# Patient Record
Sex: FEMALE | Race: WHITE | NOT HISPANIC OR LATINO | Employment: OTHER | URBAN - NONMETROPOLITAN AREA
[De-identification: names, ages, dates, MRNs, and addresses within clinical notes are randomized per-mention and may not be internally consistent; named-entity substitution may affect disease eponyms.]

---

## 2017-02-08 ENCOUNTER — TRANSCRIBE ORDERS (OUTPATIENT)
Dept: ADMINISTRATIVE | Facility: HOSPITAL | Age: 42
End: 2017-02-08

## 2017-02-08 DIAGNOSIS — R10.84 ABDOMINAL PAIN, GENERALIZED: Primary | ICD-10-CM

## 2017-02-15 ENCOUNTER — APPOINTMENT (OUTPATIENT)
Dept: CT IMAGING | Facility: HOSPITAL | Age: 42
End: 2017-02-15

## 2017-02-22 ENCOUNTER — APPOINTMENT (OUTPATIENT)
Dept: CT IMAGING | Facility: HOSPITAL | Age: 42
End: 2017-02-22

## 2019-02-07 ENCOUNTER — APPOINTMENT (OUTPATIENT)
Dept: MAMMOGRAPHY | Facility: HOSPITAL | Age: 44
End: 2019-02-07

## 2019-05-30 ENCOUNTER — TRANSCRIBE ORDERS (OUTPATIENT)
Dept: ADMINISTRATIVE | Facility: HOSPITAL | Age: 44
End: 2019-05-30

## 2019-05-30 DIAGNOSIS — M54.2 NECK PAIN: Primary | ICD-10-CM

## 2019-06-04 ENCOUNTER — APPOINTMENT (OUTPATIENT)
Dept: MRI IMAGING | Facility: HOSPITAL | Age: 44
End: 2019-06-04

## 2019-06-06 ENCOUNTER — HOSPITAL ENCOUNTER (OUTPATIENT)
Dept: MRI IMAGING | Facility: HOSPITAL | Age: 44
Discharge: HOME | End: 2019-06-06
Admitting: NURSE PRACTITIONER

## 2019-06-06 DIAGNOSIS — M54.2 NECK PAIN: ICD-10-CM

## 2019-06-06 PROCEDURE — 72141 MRI NECK SPINE W/O DYE: CPT

## 2019-06-06 PROCEDURE — 72141 MRI NECK SPINE W/O DYE: CPT | Performed by: RADIOLOGY

## 2020-03-02 ENCOUNTER — OFFICE VISIT (OUTPATIENT)
Dept: NEUROSURGERY | Facility: CLINIC | Age: 45
End: 2020-03-02

## 2020-03-02 VITALS
WEIGHT: 185 LBS | DIASTOLIC BLOOD PRESSURE: 58 MMHG | TEMPERATURE: 98.4 F | SYSTOLIC BLOOD PRESSURE: 96 MMHG | BODY MASS INDEX: 34.04 KG/M2 | HEIGHT: 62 IN

## 2020-03-02 DIAGNOSIS — I63.9 CEREBROVASCULAR ACCIDENT (CVA), UNSPECIFIED MECHANISM (HCC): ICD-10-CM

## 2020-03-02 DIAGNOSIS — G43.101 MIGRAINE WITH AURA AND WITH STATUS MIGRAINOSUS, NOT INTRACTABLE: ICD-10-CM

## 2020-03-02 DIAGNOSIS — M50.30 DEGENERATIVE DISC DISEASE, CERVICAL: Primary | ICD-10-CM

## 2020-03-02 PROCEDURE — 99203 OFFICE O/P NEW LOW 30 MIN: CPT | Performed by: NEUROLOGICAL SURGERY

## 2020-03-02 RX ORDER — DIAZEPAM 5 MG/1
5 TABLET ORAL 3 TIMES DAILY
Qty: 30 TABLET | Refills: 0 | Status: SHIPPED | OUTPATIENT
Start: 2020-03-02 | End: 2021-06-08

## 2020-03-02 RX ORDER — HYDROCHLOROTHIAZIDE 25 MG/1
25 TABLET ORAL DAILY
COMMUNITY
Start: 2015-11-09

## 2020-03-02 RX ORDER — LISINOPRIL 20 MG/1
20 TABLET ORAL DAILY
COMMUNITY
Start: 2014-03-14

## 2020-03-02 RX ORDER — CONJUGATED ESTROGENS 0.62 MG/1
0.62 TABLET, FILM COATED ORAL DAILY
COMMUNITY
Start: 2020-02-03

## 2020-03-02 RX ORDER — BACLOFEN 20 MG/1
20 TABLET ORAL 3 TIMES DAILY
COMMUNITY

## 2020-03-02 RX ORDER — IMIPRAMINE PAMOATE 75 MG
75 CAPSULE ORAL NIGHTLY
Qty: 30 CAPSULE | Refills: 0 | Status: SHIPPED | OUTPATIENT
Start: 2020-03-02 | End: 2021-06-08

## 2020-03-02 NOTE — PROGRESS NOTES
"Stacey Diaz  1975  [id number]      Chief Complaint   Patient presents with   • Neck Pain   • Shoulder Pain   • Transient Ischemic Attack     02/23/20       HISTORY OF PRESENT ILLNESS: This is a 44-year-old female referred with a chief complaint of longstanding headaches \"for years\".  She also is complaining of severe neck pain which she has had \"for years.  The headaches are holocephalic and she describes them as migraine.  Pain in the neck is axial, nonradicular and not that associated with nerve root or spinal cord compression.  She recently had what was described as a \"TIA\".  This was manifest as lessening of her levels of consciousness without focality.  She states she was told that she had had 2 previous strokes.  She has not had additional studies.  She has not seen a neurologist for migraine headache.    Past Medical History:   Diagnosis Date   • Arthritis    • Headache    • Hypertension    • Kidney disease    • Kidney disease    • Stroke (CMS/HCC)        Past Surgical History:   Procedure Laterality Date   • APPENDECTOMY     • COLON RESECTION SMALL BOWEL     • GALLBLADDER SURGERY     • HYSTERECTOMY         History reviewed. No pertinent family history.    Social History     Socioeconomic History   • Marital status:      Spouse name: Not on file   • Number of children: Not on file   • Years of education: Not on file   • Highest education level: Not on file   Tobacco Use   • Smoking status: Never Smoker   • Smokeless tobacco: Never Used   Substance and Sexual Activity   • Alcohol use: Yes   • Drug use: Never   • Sexual activity: Defer       Allergies   Allergen Reactions   • Nsaids Other (See Comments)   • Latex Rash         Current Outpatient Medications:   •  hydroCHLOROthiazide (HYDRODIURIL) 25 MG tablet, hydrochlorothiazide 25 mg tablet, Disp: , Rfl:   •  lisinopril (PRINIVIL,ZESTRIL) 20 MG tablet, lisinopril 20 mg tablet, Disp: , Rfl:   •  PREMARIN 0.625 MG tablet, , Disp: , Rfl: "     Review of Systems   Constitutional: Positive for activity change, appetite change, chills, fatigue and unexpected weight change. Negative for diaphoresis and fever.   HENT: Positive for facial swelling, tinnitus and voice change. Negative for congestion, dental problem, drooling, ear discharge, ear pain, hearing loss, mouth sores, nosebleeds, postnasal drip, rhinorrhea, sinus pressure, sinus pain, sneezing, sore throat and trouble swallowing.    Eyes: Positive for photophobia, pain, redness and visual disturbance. Negative for discharge and itching.   Respiratory: Positive for apnea and stridor. Negative for cough, choking, chest tightness, shortness of breath and wheezing.    Cardiovascular: Negative for chest pain, palpitations and leg swelling.   Gastrointestinal: Positive for abdominal distention and abdominal pain. Negative for anal bleeding, blood in stool, constipation, diarrhea, nausea, rectal pain and vomiting.   Endocrine: Positive for polydipsia. Negative for cold intolerance, heat intolerance, polyphagia and polyuria.   Genitourinary: Positive for decreased urine volume, difficulty urinating, dyspareunia, pelvic pain and vaginal pain. Negative for dysuria, enuresis, flank pain, frequency, genital sores, hematuria, menstrual problem, urgency, vaginal bleeding and vaginal discharge.   Musculoskeletal: Positive for arthralgias, back pain, gait problem, joint swelling, myalgias, neck pain and neck stiffness.   Skin: Positive for rash and wound. Negative for color change and pallor.   Allergic/Immunologic: Negative for environmental allergies, food allergies and immunocompromised state.   Neurological: Positive for dizziness, speech difficulty, weakness, light-headedness, numbness and headaches. Negative for tremors, seizures, syncope and facial asymmetry.   Hematological: Negative for adenopathy. Does not bruise/bleed easily.   Psychiatric/Behavioral: Positive for agitation, confusion, decreased  "concentration and sleep disturbance. Negative for behavioral problems, dysphoric mood, hallucinations, self-injury and suicidal ideas. The patient is nervous/anxious and is hyperactive.        Vitals:    03/02/20 1222   BP: 96/58   BP Location: Right arm   Patient Position: Sitting   Cuff Size: Adult   Temp: 98.4 °F (36.9 °C)   Weight: 83.9 kg (185 lb)   Height: 157.5 cm (62\")       Neurological Examination:  Mental status/speech: The patient is alert and oriented.  Speech is clear without aphysia or dysarthria.  No overt cognitive deficits.    Cranial nerve examination:    Olfaction: Smell is intact.  Vision: Vision is intact without visual field abnormalities.  Funduscopic examination is normal.  No pupillary irregularity.  Ocular motor examination: The extraocular muscles are intact.  There is no diplopia.  The pupil is round and reactive to both light and accommodation.  There is no nystagmus.  Facial movement/sensation: There is no facial weakness.  Sensation is intact in the first, second, and third divisions of the trigeminal nerve.  The corneal reflex is intact.  Auditory: Hearing is intact to finger rub bilaterally.  Cranial nerves IX, X, XI, XII: Phonation is normal.  No dysphagia.  Tongue is protruded in the midline without atrophy.  The gag reflex is intact.  Shoulder shrug is normal.    Musculoligamentous ligamentous examination: BMI 33.8.  She has limited range of motion of the cervical spine.  I am unable to detect focal weakness, sensory loss or reflex asymmetry.  Gait is normal.          Medical Decision Making:     Diagnostic Data Set: MRI of the brain does not show any high-grade abnormalities or recent infarction.  Cervical MRI shows mild degeneration of intervertebral disc without compression of the nerve root or spinal cord      Assessment: Stress related illness, migraine headache          Recommendations: She has no neurosurgical abnormality.  I think she needs to be evaluated by neurology in " "reference to her headache which I suspect is stress related.  I have made an appoint for her to go to comprehensive care.  She is markedly depressed, anxious and with symptoms referable to changes and her \"mood\".  I have given her a prescription of Tofranil 75 mg at bedtime; diazepam 5 mg 3 times daily.  I referred her to neurology and comprehensive care.        I greatly appreciate the opportunity to see and evaluate this individual.  If you have questions or concerns regarding issues that I may have overlooked please call me at any time: [phone].  Kolby Noel M.D.  [facility]  [address] [place] [address].  [place] [id number]    "

## 2020-03-03 ENCOUNTER — TELEPHONE (OUTPATIENT)
Dept: NEUROSURGERY | Facility: CLINIC | Age: 45
End: 2020-03-03

## 2020-03-03 RX ORDER — DULOXETIN HYDROCHLORIDE 30 MG/1
30 CAPSULE, DELAYED RELEASE ORAL DAILY
Qty: 30 CAPSULE | Refills: 0 | Status: SHIPPED | OUTPATIENT
Start: 2020-03-03 | End: 2021-06-08

## 2020-03-03 NOTE — TELEPHONE ENCOUNTER
Per Dr. Noel, pt was not given Gabapentin because she does not need that.  He gave her what he feels she needs based on her symptoms.  If she wishes to have Gabapentin she will need to see her PCP and be referred to Pain management.

## 2020-03-03 NOTE — TELEPHONE ENCOUNTER
Patient states that she has been on Cymbalta and Lyrica about 5 years ago, however it was not helpful.

## 2020-03-03 NOTE — TELEPHONE ENCOUNTER
Patient notified that her medication has been changed.  She wants to know why everyone else around her get's Gabapentin.  She wants to know why she cannot have that?

## 2020-03-03 NOTE — TELEPHONE ENCOUNTER
Provider: DR. BANG  Caller: PATIENT  Relationship to Patient: SELF  Pharmacy: [facility]  Phone Number: [phone]  Reason for Call: ASKING FOR PRIOR AUTH-IMIPRAMINE  When was the patient last seen: 03/02/20

## 2020-03-03 NOTE — TELEPHONE ENCOUNTER
I spoke with the pharmacy and this is an old anti-depressant that her Insurance will not cover.  It is $115.00 a month.    Would you like to change?

## 2020-03-04 NOTE — TELEPHONE ENCOUNTER
Pt aware that we will not RX Gabapentin, Cymbalta has been sent to her pharmacy. She will call with an update in about 2-3 weeks.

## 2020-03-11 ENCOUNTER — TELEPHONE (OUTPATIENT)
Dept: NEUROSURGERY | Facility: CLINIC | Age: 45
End: 2020-03-11

## 2020-03-11 DIAGNOSIS — G43.101 MIGRAINE WITH AURA AND WITH STATUS MIGRAINOSUS, NOT INTRACTABLE: ICD-10-CM

## 2020-03-11 DIAGNOSIS — M50.30 DEGENERATIVE DISC DISEASE, CERVICAL: ICD-10-CM

## 2020-03-11 DIAGNOSIS — I63.9 CEREBROVASCULAR ACCIDENT (CVA), UNSPECIFIED MECHANISM (HCC): Primary | ICD-10-CM

## 2020-03-11 NOTE — TELEPHONE ENCOUNTER
----- Message from Adriel Noel MD sent at 3/11/2020  2:06 PM EDT -----  Regarding: RE: CTAs  CTA and Duplex fine. Is to see neurology and karen; call me aftereward. thx  ----- Message -----  From: Silvia Sifuentes MA  Sent: 3/11/2020  10:45 AM EDT  To: Adriel Noel MD  Subject: CTAs                                             CTA head & neck was ordered for pt and to see neurology and psych.  CTA head was approved.  Neck was denied.  Ins recommends a duplex first.  You did not request a f/u with pt.  Should we proceed with duplex or schedule CTA head only?     Thanks, NK

## 2020-03-12 ENCOUNTER — APPOINTMENT (OUTPATIENT)
Dept: CT IMAGING | Facility: HOSPITAL | Age: 45
End: 2020-03-12

## 2020-03-23 ENCOUNTER — HOSPITAL ENCOUNTER (OUTPATIENT)
Dept: CT IMAGING | Facility: HOSPITAL | Age: 45
End: 2020-03-23

## 2020-03-23 ENCOUNTER — APPOINTMENT (OUTPATIENT)
Dept: CARDIOLOGY | Facility: HOSPITAL | Age: 45
End: 2020-03-23

## 2020-05-12 ENCOUNTER — APPOINTMENT (OUTPATIENT)
Dept: CT IMAGING | Facility: HOSPITAL | Age: 45
End: 2020-05-12

## 2020-05-12 ENCOUNTER — APPOINTMENT (OUTPATIENT)
Dept: CARDIOLOGY | Facility: HOSPITAL | Age: 45
End: 2020-05-12

## 2020-05-20 ENCOUNTER — HOSPITAL ENCOUNTER (OUTPATIENT)
Dept: ULTRASOUND IMAGING | Facility: HOSPITAL | Age: 45
End: 2020-05-20

## 2020-05-20 ENCOUNTER — APPOINTMENT (OUTPATIENT)
Dept: CT IMAGING | Facility: HOSPITAL | Age: 45
End: 2020-05-20

## 2020-05-20 ENCOUNTER — APPOINTMENT (OUTPATIENT)
Dept: CARDIOLOGY | Facility: HOSPITAL | Age: 45
End: 2020-05-20

## 2020-06-02 ENCOUNTER — HOSPITAL ENCOUNTER (OUTPATIENT)
Dept: ULTRASOUND IMAGING | Facility: HOSPITAL | Age: 45
End: 2020-06-02

## 2020-06-02 ENCOUNTER — APPOINTMENT (OUTPATIENT)
Dept: CT IMAGING | Facility: HOSPITAL | Age: 45
End: 2020-06-02

## 2020-07-17 ENCOUNTER — HOSPITAL ENCOUNTER (EMERGENCY)
Facility: HOSPITAL | Age: 45
Discharge: LEFT BEFORE DISCHARGE | End: 2020-07-17

## 2020-07-17 VITALS
RESPIRATION RATE: 18 BRPM | TEMPERATURE: 98.9 F | DIASTOLIC BLOOD PRESSURE: 65 MMHG | HEART RATE: 94 BPM | HEIGHT: 62 IN | OXYGEN SATURATION: 98 % | WEIGHT: 185 LBS | BODY MASS INDEX: 34.04 KG/M2 | SYSTOLIC BLOOD PRESSURE: 133 MMHG

## 2020-07-18 NOTE — ED NOTES
"Pt told triage tech that she was leaving; states \" this place is inhumane I am going to [place]\" refused to sign LWBS form     Marlon Tompkins, RN  07/18/20 0694    "

## 2020-07-18 NOTE — ED NOTES
"Pt  in waiting room screaming \" I need you to get my wife an ambulance or a helicopter to a [place]. You're not doing anything by letting her sit here in the lobby\" advised him that the patients are taken back based on acuity and that patient would be roomed as soon as possible. Apologized for delay and explained to him that patient could not be transferred without provider evaluation and workup. Patients  states \" [facility]. Her pain is worse\" pt continues to complain of abdominal pain rated at a 10; patient appears to be in no acute distress. Advised patient room assignment will be made as soon as possible again.      Marlon Tompkins, RN  07/18/20 0676    "

## 2020-11-11 ENCOUNTER — TELEPHONE (OUTPATIENT)
Dept: NEUROSURGERY | Facility: CLINIC | Age: 45
End: 2020-11-11

## 2020-11-11 DIAGNOSIS — G43.101 MIGRAINE WITH AURA AND WITH STATUS MIGRAINOSUS, NOT INTRACTABLE: ICD-10-CM

## 2020-11-11 DIAGNOSIS — I63.9 CEREBROVASCULAR ACCIDENT (CVA), UNSPECIFIED MECHANISM: Primary | ICD-10-CM

## 2020-11-11 DIAGNOSIS — M50.30 DEGENERATIVE DISC DISEASE, CERVICAL: ICD-10-CM

## 2020-11-11 NOTE — TELEPHONE ENCOUNTER
Caller: PT    Relationship: SELF    Best call back number: [phone]    What orders are you requesting (i.e. lab or imaging): CT ORDERS    In what timeframe would the patient need to come in: ASAP    Where will you receive your lab/imaging services: [facility]    Additional notes: PT STATES THAT THE CT ORDERS ARE  AND THAT SHE NEEDS IT REORDERED.

## 2020-12-03 ENCOUNTER — HOSPITAL ENCOUNTER (OUTPATIENT)
Dept: CARDIOLOGY | Facility: HOSPITAL | Age: 45
Discharge: HOME | End: 2020-12-03

## 2020-12-03 ENCOUNTER — HOSPITAL ENCOUNTER (OUTPATIENT)
Dept: CT IMAGING | Facility: HOSPITAL | Age: 45
Discharge: HOME | End: 2020-12-03

## 2020-12-03 DIAGNOSIS — I63.9 CEREBROVASCULAR ACCIDENT (CVA), UNSPECIFIED MECHANISM (HCC): ICD-10-CM

## 2020-12-03 DIAGNOSIS — G43.101 MIGRAINE WITH AURA AND WITH STATUS MIGRAINOSUS, NOT INTRACTABLE: ICD-10-CM

## 2020-12-03 DIAGNOSIS — M50.30 DEGENERATIVE DISC DISEASE, CERVICAL: ICD-10-CM

## 2020-12-03 DIAGNOSIS — I63.9 CEREBROVASCULAR ACCIDENT (CVA), UNSPECIFIED MECHANISM (HCC): Primary | ICD-10-CM

## 2020-12-03 PROCEDURE — 70496 CT ANGIOGRAPHY HEAD: CPT

## 2020-12-03 PROCEDURE — 93880 EXTRACRANIAL BILAT STUDY: CPT

## 2020-12-03 PROCEDURE — 70498 CT ANGIOGRAPHY NECK: CPT | Performed by: RADIOLOGY

## 2020-12-03 PROCEDURE — 70496 CT ANGIOGRAPHY HEAD: CPT | Performed by: RADIOLOGY

## 2020-12-03 PROCEDURE — 93880 EXTRACRANIAL BILAT STUDY: CPT | Performed by: RADIOLOGY

## 2020-12-03 PROCEDURE — 25010000002 IOPAMIDOL 61 % SOLUTION: Performed by: NEUROLOGICAL SURGERY

## 2020-12-03 PROCEDURE — 70498 CT ANGIOGRAPHY NECK: CPT

## 2020-12-03 RX ADMIN — IOPAMIDOL 100 ML: 612 INJECTION, SOLUTION INTRAVENOUS at 16:53

## 2020-12-04 ENCOUNTER — DOCUMENTATION (OUTPATIENT)
Dept: NEUROSURGERY | Facility: CLINIC | Age: 45
End: 2020-12-04

## 2020-12-04 ENCOUNTER — TELEPHONE (OUTPATIENT)
Dept: NEUROSURGERY | Facility: CLINIC | Age: 45
End: 2020-12-04

## 2020-12-04 NOTE — TELEPHONE ENCOUNTER
Caller: PT    Relationship to patient: SELF    Best call back number: [phone]    Chief complaint:     Type of visit: FOLLOW UP ([place])    Requested date: ASAP     If rescheduling, when is the original appointment: 12/03/20     Additional notes:PT GOT HELD UP AT THE HOSPITAL UNTIL 5:00 GETTING HER CAROTID ULTRASOUND AND HER CT ANGIOGRAM HEAD AND NECK. BY THE TIME SHE MADE IT TO OUR OFFICE IN [place] WE WERE GONE.  SHE WANTS TO KNOW IF THE DR CAN LOOK AT HER TEST AND CALL HER IF THERE IS SOMETHING THAT SHE NEEDS TO KNOW.

## 2020-12-04 NOTE — PROGRESS NOTES
Reviewed studies. ALL normal indicating no surgical abnormality to explain her symptoms. Reviewed cervical MRI (6/19). No HNP or abnormality that provides clinical correlation. Suggest she see PCP, ? neurology

## 2021-03-23 ENCOUNTER — BULK ORDERING (OUTPATIENT)
Dept: CASE MANAGEMENT | Facility: OTHER | Age: 46
End: 2021-03-23

## 2021-03-23 DIAGNOSIS — Z23 IMMUNIZATION DUE: ICD-10-CM

## 2021-06-08 ENCOUNTER — APPOINTMENT (OUTPATIENT)
Dept: CT IMAGING | Facility: HOSPITAL | Age: 46
End: 2021-06-08

## 2021-06-08 ENCOUNTER — HOSPITAL ENCOUNTER (INPATIENT)
Facility: HOSPITAL | Age: 46
LOS: 2 days | Discharge: LEFT BEFORE DISCHARGE | End: 2021-06-10
Attending: STUDENT IN AN ORGANIZED HEALTH CARE EDUCATION/TRAINING PROGRAM | Admitting: INTERNAL MEDICINE

## 2021-06-08 ENCOUNTER — APPOINTMENT (OUTPATIENT)
Dept: GENERAL RADIOLOGY | Facility: HOSPITAL | Age: 46
End: 2021-06-08

## 2021-06-08 DIAGNOSIS — J18.9 PNEUMONIA OF LEFT LUNG DUE TO INFECTIOUS ORGANISM, UNSPECIFIED PART OF LUNG: Primary | ICD-10-CM

## 2021-06-08 DIAGNOSIS — N30.01 ACUTE CYSTITIS WITH HEMATURIA: ICD-10-CM

## 2021-06-08 DIAGNOSIS — A41.9 SEPTIC SHOCK (HCC): ICD-10-CM

## 2021-06-08 DIAGNOSIS — R65.21 SEPTIC SHOCK (HCC): ICD-10-CM

## 2021-06-08 PROBLEM — R65.20 SEVERE SEPSIS (HCC): Status: ACTIVE | Noted: 2021-06-08

## 2021-06-08 LAB
6-ACETYL MORPHINE: NEGATIVE
A-A DO2: 102.8 MMHG (ref 0–300)
A-A DO2: 141.9 MMHG (ref 0–300)
ALBUMIN SERPL-MCNC: 2.82 G/DL (ref 3.5–5.2)
ALBUMIN/GLOB SERPL: 1.1 G/DL
ALP SERPL-CCNC: 45 U/L (ref 39–117)
ALT SERPL W P-5'-P-CCNC: 23 U/L (ref 1–33)
AMMONIA BLD-SCNC: 55 UMOL/L (ref 11–51)
AMPHET+METHAMPHET UR QL: NEGATIVE
ANION GAP SERPL CALCULATED.3IONS-SCNC: 9.2 MMOL/L (ref 5–15)
APAP SERPL-MCNC: <5 MCG/ML (ref 0–30)
APTT PPP: 28.9 SECONDS (ref 25.5–35.4)
ARTERIAL PATENCY WRIST A: ABNORMAL
ARTERIAL PATENCY WRIST A: ABNORMAL
AST SERPL-CCNC: 13 U/L (ref 1–32)
ATMOSPHERIC PRESS: 729 MMHG
ATMOSPHERIC PRESS: 730 MMHG
B PARAPERT DNA SPEC QL NAA+PROBE: NOT DETECTED
B PERT DNA SPEC QL NAA+PROBE: NOT DETECTED
BACTERIA UR QL AUTO: ABNORMAL /HPF
BARBITURATES UR QL SCN: NEGATIVE
BASE EXCESS BLDA CALC-SCNC: 4.1 MMOL/L (ref 0–2)
BASE EXCESS BLDA CALC-SCNC: 9.8 MMOL/L (ref 0–2)
BASOPHILS # BLD AUTO: 0.02 10*3/MM3 (ref 0–0.2)
BASOPHILS NFR BLD AUTO: 0.1 % (ref 0–1.5)
BDY SITE: ABNORMAL
BDY SITE: ABNORMAL
BENZODIAZ UR QL SCN: POSITIVE
BILIRUB SERPL-MCNC: 0.5 MG/DL (ref 0–1.2)
BILIRUB UR QL STRIP: ABNORMAL
BODY TEMPERATURE: 0 C
BODY TEMPERATURE: 0 C
BUN SERPL-MCNC: 28 MG/DL (ref 6–20)
BUN/CREAT SERPL: 14.5 (ref 7–25)
BUPRENORPHINE SERPL-MCNC: NEGATIVE NG/ML
C PNEUM DNA NPH QL NAA+NON-PROBE: NOT DETECTED
CALCIUM SPEC-SCNC: 7.9 MG/DL (ref 8.6–10.5)
CANNABINOIDS SERPL QL: POSITIVE
CHLORIDE SERPL-SCNC: 97 MMOL/L (ref 98–107)
CK SERPL-CCNC: 109 U/L (ref 20–180)
CLARITY UR: ABNORMAL
CO2 BLDA-SCNC: 30.1 MMOL/L (ref 22–33)
CO2 BLDA-SCNC: 35 MMOL/L (ref 22–33)
CO2 SERPL-SCNC: 28.8 MMOL/L (ref 22–29)
COCAINE UR QL: NEGATIVE
COHGB MFR BLD: 0.5 % (ref 0–5)
COHGB MFR BLD: 0.7 % (ref 0–5)
COLOR UR: ABNORMAL
CREAT SERPL-MCNC: 1.93 MG/DL (ref 0.57–1)
CRP SERPL-MCNC: 25.29 MG/DL (ref 0–0.5)
D DIMER PPP FEU-MCNC: 0.64 MCGFEU/ML (ref 0–0.5)
D-LACTATE SERPL-SCNC: 0.8 MMOL/L (ref 0.5–2)
DEPRECATED RDW RBC AUTO: 53.8 FL (ref 37–54)
EOSINOPHIL # BLD AUTO: 0.03 10*3/MM3 (ref 0–0.4)
EOSINOPHIL NFR BLD AUTO: 0.2 % (ref 0.3–6.2)
ERYTHROCYTE [DISTWIDTH] IN BLOOD BY AUTOMATED COUNT: 16.3 % (ref 12.3–15.4)
ERYTHROCYTE [SEDIMENTATION RATE] IN BLOOD: 12 MM/HR (ref 0–20)
ETHANOL BLD-MCNC: <10 MG/DL (ref 0–10)
ETHANOL UR QL: <0.01 %
FLUAV RNA RESP QL NAA+PROBE: NOT DETECTED
FLUAV SUBTYP SPEC NAA+PROBE: NOT DETECTED
FLUBV RNA ISLT QL NAA+PROBE: NOT DETECTED
FLUBV RNA RESP QL NAA+PROBE: NOT DETECTED
GAS FLOW AIRWAY: 2 LPM
GAS FLOW AIRWAY: 4 LPM
GFR SERPL CREATININE-BSD FRML MDRD: 28 ML/MIN/1.73
GLOBULIN UR ELPH-MCNC: 2.5 GM/DL
GLUCOSE SERPL-MCNC: 196 MG/DL (ref 65–99)
GLUCOSE UR STRIP-MCNC: ABNORMAL MG/DL
HADV DNA SPEC NAA+PROBE: NOT DETECTED
HAV IGM SERPL QL IA: NORMAL
HBV CORE IGM SERPL QL IA: NORMAL
HBV SURFACE AG SERPL QL IA: NORMAL
HCO3 BLDA-SCNC: 28.8 MMOL/L (ref 20–26)
HCO3 BLDA-SCNC: 33.7 MMOL/L (ref 20–26)
HCOV 229E RNA SPEC QL NAA+PROBE: NOT DETECTED
HCOV HKU1 RNA SPEC QL NAA+PROBE: NOT DETECTED
HCOV NL63 RNA SPEC QL NAA+PROBE: NOT DETECTED
HCOV OC43 RNA SPEC QL NAA+PROBE: NOT DETECTED
HCT VFR BLD AUTO: 40.3 % (ref 34–46.6)
HCT VFR BLD CALC: 36.4 % (ref 38–51)
HCT VFR BLD CALC: 43.8 % (ref 38–51)
HCV AB SER DONR QL: NORMAL
HGB BLD-MCNC: 12.6 G/DL (ref 12–15.9)
HGB BLDA-MCNC: 11.9 G/DL (ref 13.5–17.5)
HGB BLDA-MCNC: 14.3 G/DL (ref 13.5–17.5)
HGB UR QL STRIP.AUTO: ABNORMAL
HMPV RNA NPH QL NAA+NON-PROBE: NOT DETECTED
HOLD SPECIMEN: NORMAL
HOLD SPECIMEN: NORMAL
HPIV1 RNA SPEC QL NAA+PROBE: NOT DETECTED
HPIV2 RNA SPEC QL NAA+PROBE: NOT DETECTED
HPIV3 RNA NPH QL NAA+PROBE: NOT DETECTED
HPIV4 P GENE NPH QL NAA+PROBE: NOT DETECTED
HYALINE CASTS UR QL AUTO: ABNORMAL /LPF
IMM GRANULOCYTES # BLD AUTO: 0.12 10*3/MM3 (ref 0–0.05)
IMM GRANULOCYTES NFR BLD AUTO: 0.8 % (ref 0–0.5)
INHALED O2 CONCENTRATION: 28 %
INHALED O2 CONCENTRATION: 36 %
INR PPP: 0.95 (ref 0.9–1.1)
KETONES UR QL STRIP: ABNORMAL
LEUKOCYTE ESTERASE UR QL STRIP.AUTO: ABNORMAL
LYMPHOCYTES # BLD AUTO: 3.41 10*3/MM3 (ref 0.7–3.1)
LYMPHOCYTES NFR BLD AUTO: 22.9 % (ref 19.6–45.3)
Lab: ABNORMAL
M PNEUMO IGG SER IA-ACNC: NOT DETECTED
MAGNESIUM SERPL-MCNC: 1.8 MG/DL (ref 1.6–2.6)
MCH RBC QN AUTO: 27.8 PG (ref 26.6–33)
MCHC RBC AUTO-ENTMCNC: 31.3 G/DL (ref 31.5–35.7)
MCV RBC AUTO: 89 FL (ref 79–97)
METHADONE UR QL SCN: NEGATIVE
METHGB BLD QL: 0.5 % (ref 0–3)
METHGB BLD QL: 0.7 % (ref 0–3)
MODALITY: ABNORMAL
MODALITY: ABNORMAL
MONOCYTES # BLD AUTO: 0.66 10*3/MM3 (ref 0.1–0.9)
MONOCYTES NFR BLD AUTO: 4.4 % (ref 5–12)
MRSA DNA SPEC QL NAA+PROBE: NEGATIVE
NEUTROPHILS NFR BLD AUTO: 10.64 10*3/MM3 (ref 1.7–7)
NEUTROPHILS NFR BLD AUTO: 71.6 % (ref 42.7–76)
NITRITE UR QL STRIP: POSITIVE
NOTE: ABNORMAL
NOTE: ABNORMAL
NOTIFIED BY: ABNORMAL
NOTIFIED BY: ABNORMAL
NOTIFIED WHO: ABNORMAL
NOTIFIED WHO: ABNORMAL
NRBC BLD AUTO-RTO: 0.1 /100 WBC (ref 0–0.2)
NT-PROBNP SERPL-MCNC: 1163 PG/ML (ref 0–450)
OPIATES UR QL: POSITIVE
OXYCODONE UR QL SCN: POSITIVE
OXYHGB MFR BLDV: 78.6 % (ref 94–99)
OXYHGB MFR BLDV: 86.7 % (ref 94–99)
PCO2 BLDA: 41.6 MM HG (ref 35–45)
PCO2 BLDA: 42.9 MM HG (ref 35–45)
PCO2 TEMP ADJ BLD: ABNORMAL MM[HG]
PCO2 TEMP ADJ BLD: ABNORMAL MM[HG]
PCP UR QL SCN: NEGATIVE
PH BLDA: 7.43 PH UNITS (ref 7.35–7.45)
PH BLDA: 7.52 PH UNITS (ref 7.35–7.45)
PH UR STRIP.AUTO: 5.5 [PH] (ref 5–8)
PH, TEMP CORRECTED: ABNORMAL
PH, TEMP CORRECTED: ABNORMAL
PHOSPHATE SERPL-MCNC: 2.6 MG/DL (ref 2.5–4.5)
PLATELET # BLD AUTO: 125 10*3/MM3 (ref 140–450)
PMV BLD AUTO: 9.5 FL (ref 6–12)
PO2 BLDA: 41.9 MM HG (ref 83–108)
PO2 BLDA: 56.3 MM HG (ref 83–108)
PO2 TEMP ADJ BLD: ABNORMAL MM[HG]
PO2 TEMP ADJ BLD: ABNORMAL MM[HG]
POTASSIUM SERPL-SCNC: 4 MMOL/L (ref 3.5–5.2)
PROT SERPL-MCNC: 5.3 G/DL (ref 6–8.5)
PROT UR QL STRIP: ABNORMAL
PROTHROMBIN TIME: 13.1 SECONDS (ref 12.8–14.5)
RBC # BLD AUTO: 4.53 10*6/MM3 (ref 3.77–5.28)
RBC # UR: ABNORMAL /HPF
REF LAB TEST METHOD: ABNORMAL
RHINOVIRUS RNA SPEC NAA+PROBE: NOT DETECTED
RSV RNA NPH QL NAA+NON-PROBE: NOT DETECTED
S AUREUS DNA SPEC QL NAA+PROBE: NEGATIVE
SALICYLATES SERPL-MCNC: <0.3 MG/DL
SAO2 % BLDCOA: 79.6 % (ref 94–99)
SAO2 % BLDCOA: 87.8 % (ref 94–99)
SARS-COV-2 RNA RESP QL NAA+PROBE: NOT DETECTED
SODIUM SERPL-SCNC: 135 MMOL/L (ref 136–145)
SP GR UR STRIP: >=1.03 (ref 1–1.03)
SQUAMOUS #/AREA URNS HPF: ABNORMAL /HPF
T4 FREE SERPL-MCNC: 0.88 NG/DL (ref 0.93–1.7)
TROPONIN T SERPL-MCNC: 0.02 NG/ML (ref 0–0.03)
TROPONIN T SERPL-MCNC: <0.01 NG/ML (ref 0–0.03)
TSH SERPL DL<=0.05 MIU/L-ACNC: 0.72 UIU/ML (ref 0.27–4.2)
UROBILINOGEN UR QL STRIP: ABNORMAL
VENTILATOR MODE: ABNORMAL
VENTILATOR MODE: ABNORMAL
WBC # BLD AUTO: 14.88 10*3/MM3 (ref 3.4–10.8)
WBC UR QL AUTO: ABNORMAL /HPF
WHOLE BLOOD HOLD SPECIMEN: NORMAL
WHOLE BLOOD HOLD SPECIMEN: NORMAL

## 2021-06-08 PROCEDURE — C1751 CATH, INF, PER/CENT/MIDLINE: HCPCS

## 2021-06-08 PROCEDURE — P9612 CATHETERIZE FOR URINE SPEC: HCPCS

## 2021-06-08 PROCEDURE — 80053 COMPREHEN METABOLIC PANEL: CPT | Performed by: STUDENT IN AN ORGANIZED HEALTH CARE EDUCATION/TRAINING PROGRAM

## 2021-06-08 PROCEDURE — 87040 BLOOD CULTURE FOR BACTERIA: CPT | Performed by: STUDENT IN AN ORGANIZED HEALTH CARE EDUCATION/TRAINING PROGRAM

## 2021-06-08 PROCEDURE — 80307 DRUG TEST PRSMV CHEM ANLYZR: CPT | Performed by: STUDENT IN AN ORGANIZED HEALTH CARE EDUCATION/TRAINING PROGRAM

## 2021-06-08 PROCEDURE — 25010000002 PIPERACILLIN SOD-TAZOBACTAM PER 1 G: Performed by: STUDENT IN AN ORGANIZED HEALTH CARE EDUCATION/TRAINING PROGRAM

## 2021-06-08 PROCEDURE — 70450 CT HEAD/BRAIN W/O DYE: CPT

## 2021-06-08 PROCEDURE — 83605 ASSAY OF LACTIC ACID: CPT | Performed by: STUDENT IN AN ORGANIZED HEALTH CARE EDUCATION/TRAINING PROGRAM

## 2021-06-08 PROCEDURE — 84484 ASSAY OF TROPONIN QUANT: CPT | Performed by: STUDENT IN AN ORGANIZED HEALTH CARE EDUCATION/TRAINING PROGRAM

## 2021-06-08 PROCEDURE — 94640 AIRWAY INHALATION TREATMENT: CPT

## 2021-06-08 PROCEDURE — 87077 CULTURE AEROBIC IDENTIFY: CPT | Performed by: INTERNAL MEDICINE

## 2021-06-08 PROCEDURE — 80074 ACUTE HEPATITIS PANEL: CPT | Performed by: HOSPITALIST

## 2021-06-08 PROCEDURE — 83735 ASSAY OF MAGNESIUM: CPT | Performed by: STUDENT IN AN ORGANIZED HEALTH CARE EDUCATION/TRAINING PROGRAM

## 2021-06-08 PROCEDURE — 85652 RBC SED RATE AUTOMATED: CPT | Performed by: STUDENT IN AN ORGANIZED HEALTH CARE EDUCATION/TRAINING PROGRAM

## 2021-06-08 PROCEDURE — 85025 COMPLETE CBC W/AUTO DIFF WBC: CPT | Performed by: STUDENT IN AN ORGANIZED HEALTH CARE EDUCATION/TRAINING PROGRAM

## 2021-06-08 PROCEDURE — 25010000002 CEFTRIAXONE PER 250 MG: Performed by: STUDENT IN AN ORGANIZED HEALTH CARE EDUCATION/TRAINING PROGRAM

## 2021-06-08 PROCEDURE — 93010 ELECTROCARDIOGRAM REPORT: CPT | Performed by: INTERNAL MEDICINE

## 2021-06-08 PROCEDURE — 82375 ASSAY CARBOXYHB QUANT: CPT

## 2021-06-08 PROCEDURE — 83050 HGB METHEMOGLOBIN QUAN: CPT

## 2021-06-08 PROCEDURE — 85730 THROMBOPLASTIN TIME PARTIAL: CPT | Performed by: STUDENT IN AN ORGANIZED HEALTH CARE EDUCATION/TRAINING PROGRAM

## 2021-06-08 PROCEDURE — 82550 ASSAY OF CK (CPK): CPT | Performed by: STUDENT IN AN ORGANIZED HEALTH CARE EDUCATION/TRAINING PROGRAM

## 2021-06-08 PROCEDURE — 70450 CT HEAD/BRAIN W/O DYE: CPT | Performed by: RADIOLOGY

## 2021-06-08 PROCEDURE — 80179 DRUG ASSAY SALICYLATE: CPT | Performed by: STUDENT IN AN ORGANIZED HEALTH CARE EDUCATION/TRAINING PROGRAM

## 2021-06-08 PROCEDURE — 36600 WITHDRAWAL OF ARTERIAL BLOOD: CPT

## 2021-06-08 PROCEDURE — 81001 URINALYSIS AUTO W/SCOPE: CPT | Performed by: STUDENT IN AN ORGANIZED HEALTH CARE EDUCATION/TRAINING PROGRAM

## 2021-06-08 PROCEDURE — 86140 C-REACTIVE PROTEIN: CPT | Performed by: STUDENT IN AN ORGANIZED HEALTH CARE EDUCATION/TRAINING PROGRAM

## 2021-06-08 PROCEDURE — 74176 CT ABD & PELVIS W/O CONTRAST: CPT | Performed by: RADIOLOGY

## 2021-06-08 PROCEDURE — 87086 URINE CULTURE/COLONY COUNT: CPT | Performed by: INTERNAL MEDICINE

## 2021-06-08 PROCEDURE — 71250 CT THORAX DX C-: CPT | Performed by: RADIOLOGY

## 2021-06-08 PROCEDURE — 87640 STAPH A DNA AMP PROBE: CPT | Performed by: HOSPITALIST

## 2021-06-08 PROCEDURE — 82077 ASSAY SPEC XCP UR&BREATH IA: CPT | Performed by: STUDENT IN AN ORGANIZED HEALTH CARE EDUCATION/TRAINING PROGRAM

## 2021-06-08 PROCEDURE — 87641 MR-STAPH DNA AMP PROBE: CPT | Performed by: HOSPITALIST

## 2021-06-08 PROCEDURE — 71045 X-RAY EXAM CHEST 1 VIEW: CPT | Performed by: RADIOLOGY

## 2021-06-08 PROCEDURE — 99223 1ST HOSP IP/OBS HIGH 75: CPT | Performed by: HOSPITALIST

## 2021-06-08 PROCEDURE — 84100 ASSAY OF PHOSPHORUS: CPT | Performed by: STUDENT IN AN ORGANIZED HEALTH CARE EDUCATION/TRAINING PROGRAM

## 2021-06-08 PROCEDURE — 93005 ELECTROCARDIOGRAM TRACING: CPT | Performed by: STUDENT IN AN ORGANIZED HEALTH CARE EDUCATION/TRAINING PROGRAM

## 2021-06-08 PROCEDURE — 82140 ASSAY OF AMMONIA: CPT | Performed by: STUDENT IN AN ORGANIZED HEALTH CARE EDUCATION/TRAINING PROGRAM

## 2021-06-08 PROCEDURE — 85379 FIBRIN DEGRADATION QUANT: CPT | Performed by: STUDENT IN AN ORGANIZED HEALTH CARE EDUCATION/TRAINING PROGRAM

## 2021-06-08 PROCEDURE — 71045 X-RAY EXAM CHEST 1 VIEW: CPT

## 2021-06-08 PROCEDURE — 74176 CT ABD & PELVIS W/O CONTRAST: CPT

## 2021-06-08 PROCEDURE — 87636 SARSCOV2 & INF A&B AMP PRB: CPT | Performed by: STUDENT IN AN ORGANIZED HEALTH CARE EDUCATION/TRAINING PROGRAM

## 2021-06-08 PROCEDURE — 84439 ASSAY OF FREE THYROXINE: CPT | Performed by: STUDENT IN AN ORGANIZED HEALTH CARE EDUCATION/TRAINING PROGRAM

## 2021-06-08 PROCEDURE — 84484 ASSAY OF TROPONIN QUANT: CPT | Performed by: HOSPITALIST

## 2021-06-08 PROCEDURE — 99285 EMERGENCY DEPT VISIT HI MDM: CPT

## 2021-06-08 PROCEDURE — 82962 GLUCOSE BLOOD TEST: CPT

## 2021-06-08 PROCEDURE — 25010000002 CEFEPIME PER 500 MG: Performed by: HOSPITALIST

## 2021-06-08 PROCEDURE — 71250 CT THORAX DX C-: CPT

## 2021-06-08 PROCEDURE — 83880 ASSAY OF NATRIURETIC PEPTIDE: CPT | Performed by: HOSPITALIST

## 2021-06-08 PROCEDURE — 25010000002 NALOXONE PER 1 MG: Performed by: HOSPITALIST

## 2021-06-08 PROCEDURE — 94799 UNLISTED PULMONARY SVC/PX: CPT

## 2021-06-08 PROCEDURE — 84443 ASSAY THYROID STIM HORMONE: CPT | Performed by: STUDENT IN AN ORGANIZED HEALTH CARE EDUCATION/TRAINING PROGRAM

## 2021-06-08 PROCEDURE — 87633 RESP VIRUS 12-25 TARGETS: CPT | Performed by: HOSPITALIST

## 2021-06-08 PROCEDURE — 87186 SC STD MICRODIL/AGAR DIL: CPT | Performed by: INTERNAL MEDICINE

## 2021-06-08 PROCEDURE — 80143 DRUG ASSAY ACETAMINOPHEN: CPT | Performed by: STUDENT IN AN ORGANIZED HEALTH CARE EDUCATION/TRAINING PROGRAM

## 2021-06-08 PROCEDURE — 82805 BLOOD GASES W/O2 SATURATION: CPT

## 2021-06-08 PROCEDURE — 85610 PROTHROMBIN TIME: CPT | Performed by: STUDENT IN AN ORGANIZED HEALTH CARE EDUCATION/TRAINING PROGRAM

## 2021-06-08 PROCEDURE — 25010000002 VANCOMYCIN: Performed by: STUDENT IN AN ORGANIZED HEALTH CARE EDUCATION/TRAINING PROGRAM

## 2021-06-08 RX ORDER — NALOXONE HYDROCHLORIDE 1 MG/ML
2 INJECTION INTRAMUSCULAR; INTRAVENOUS; SUBCUTANEOUS ONCE
Status: COMPLETED | OUTPATIENT
Start: 2021-06-08 | End: 2021-06-08

## 2021-06-08 RX ORDER — LISINOPRIL 10 MG/1
20 TABLET ORAL DAILY
Status: CANCELLED | OUTPATIENT
Start: 2021-06-09

## 2021-06-08 RX ORDER — SODIUM CHLORIDE 0.9 % (FLUSH) 0.9 %
10 SYRINGE (ML) INJECTION EVERY 12 HOURS SCHEDULED
Status: DISCONTINUED | OUTPATIENT
Start: 2021-06-09 | End: 2021-06-10 | Stop reason: HOSPADM

## 2021-06-08 RX ORDER — NALOXONE HCL 0.4 MG/ML
0.4 VIAL (ML) INJECTION ONCE
Status: COMPLETED | OUTPATIENT
Start: 2021-06-08 | End: 2021-06-08

## 2021-06-08 RX ORDER — UBIDECARENONE 100 MG
100 CAPSULE ORAL DAILY
COMMUNITY

## 2021-06-08 RX ORDER — ACETAMINOPHEN 650 MG/1
650 SUPPOSITORY RECTAL EVERY 4 HOURS PRN
Status: DISCONTINUED | OUTPATIENT
Start: 2021-06-08 | End: 2021-06-10 | Stop reason: HOSPADM

## 2021-06-08 RX ORDER — IPRATROPIUM BROMIDE AND ALBUTEROL SULFATE 2.5; .5 MG/3ML; MG/3ML
3 SOLUTION RESPIRATORY (INHALATION) ONCE
Status: COMPLETED | OUTPATIENT
Start: 2021-06-08 | End: 2021-06-08

## 2021-06-08 RX ORDER — SODIUM CHLORIDE 9 MG/ML
75 INJECTION, SOLUTION INTRAVENOUS CONTINUOUS
Status: DISCONTINUED | OUTPATIENT
Start: 2021-06-08 | End: 2021-06-10 | Stop reason: HOSPADM

## 2021-06-08 RX ORDER — PANTOPRAZOLE SODIUM 40 MG/1
40 TABLET, DELAYED RELEASE ORAL DAILY
COMMUNITY

## 2021-06-08 RX ORDER — SODIUM CHLORIDE 0.9 % (FLUSH) 0.9 %
10 SYRINGE (ML) INJECTION AS NEEDED
Status: DISCONTINUED | OUTPATIENT
Start: 2021-06-08 | End: 2021-06-10 | Stop reason: HOSPADM

## 2021-06-08 RX ORDER — LEVOTHYROXINE SODIUM 0.03 MG/1
25 TABLET ORAL DAILY
COMMUNITY

## 2021-06-08 RX ORDER — HYDROCHLOROTHIAZIDE 25 MG/1
25 TABLET ORAL DAILY
Status: CANCELLED | OUTPATIENT
Start: 2021-06-09

## 2021-06-08 RX ORDER — ACETAMINOPHEN 650 MG/1
650 SUPPOSITORY RECTAL ONCE
Status: COMPLETED | OUTPATIENT
Start: 2021-06-08 | End: 2021-06-08

## 2021-06-08 RX ORDER — SODIUM CHLORIDE 0.9 % (FLUSH) 0.9 %
20 SYRINGE (ML) INJECTION AS NEEDED
Status: DISCONTINUED | OUTPATIENT
Start: 2021-06-08 | End: 2021-06-10 | Stop reason: HOSPADM

## 2021-06-08 RX ORDER — SODIUM CHLORIDE 0.9 % (FLUSH) 0.9 %
10 SYRINGE (ML) INJECTION EVERY 12 HOURS SCHEDULED
Status: DISCONTINUED | OUTPATIENT
Start: 2021-06-08 | End: 2021-06-10 | Stop reason: HOSPADM

## 2021-06-08 RX ORDER — NALOXONE HYDROCHLORIDE 1 MG/ML
INJECTION INTRAMUSCULAR; INTRAVENOUS; SUBCUTANEOUS
Status: COMPLETED
Start: 2021-06-08 | End: 2021-06-08

## 2021-06-08 RX ORDER — ACETAMINOPHEN 325 MG/1
650 TABLET ORAL EVERY 4 HOURS PRN
Status: DISCONTINUED | OUTPATIENT
Start: 2021-06-08 | End: 2021-06-10 | Stop reason: HOSPADM

## 2021-06-08 RX ORDER — ONDANSETRON HYDROCHLORIDE 8 MG/1
8 TABLET, FILM COATED ORAL EVERY 8 HOURS PRN
COMMUNITY

## 2021-06-08 RX ORDER — IPRATROPIUM BROMIDE AND ALBUTEROL SULFATE 2.5; .5 MG/3ML; MG/3ML
3 SOLUTION RESPIRATORY (INHALATION)
Status: DISCONTINUED | OUTPATIENT
Start: 2021-06-09 | End: 2021-06-10 | Stop reason: HOSPADM

## 2021-06-08 RX ORDER — BACLOFEN 10 MG/1
20 TABLET ORAL 3 TIMES DAILY
Status: CANCELLED | OUTPATIENT
Start: 2021-06-08

## 2021-06-08 RX ADMIN — NALOXONE HYDROCHLORIDE 0.4 MG: 0.4 INJECTION, SOLUTION INTRAMUSCULAR; INTRAVENOUS; SUBCUTANEOUS at 19:59

## 2021-06-08 RX ADMIN — CEFEPIME 2 G: 2 INJECTION, POWDER, FOR SOLUTION INTRAVENOUS at 21:56

## 2021-06-08 RX ADMIN — METRONIDAZOLE 500 MG: 500 INJECTION, SOLUTION INTRAVENOUS at 21:56

## 2021-06-08 RX ADMIN — NALOXONE HYDROCHLORIDE 2 MG: 1 INJECTION INTRAMUSCULAR; INTRAVENOUS; SUBCUTANEOUS at 13:17

## 2021-06-08 RX ADMIN — SODIUM CHLORIDE 125 ML/HR: 9 INJECTION, SOLUTION INTRAVENOUS at 15:15

## 2021-06-08 RX ADMIN — SODIUM CHLORIDE 125 ML/HR: 9 INJECTION, SOLUTION INTRAVENOUS at 21:30

## 2021-06-08 RX ADMIN — ACETAMINOPHEN 650 MG: 650 SUPPOSITORY RECTAL at 13:46

## 2021-06-08 RX ADMIN — PIPERACILLIN SODIUM AND TAZOBACTAM SODIUM 3.38 G: 3; .375 INJECTION, POWDER, LYOPHILIZED, FOR SOLUTION INTRAVENOUS at 15:57

## 2021-06-08 RX ADMIN — SODIUM CHLORIDE 2000 ML: 9 INJECTION, SOLUTION INTRAVENOUS at 13:48

## 2021-06-08 RX ADMIN — SODIUM CHLORIDE 2 G: 900 INJECTION INTRAVENOUS at 15:11

## 2021-06-08 RX ADMIN — IPRATROPIUM BROMIDE AND ALBUTEROL SULFATE 3 ML: .5; 3 SOLUTION RESPIRATORY (INHALATION) at 17:10

## 2021-06-08 RX ADMIN — NALOXONE HYDROCHLORIDE 2 MG: 1 INJECTION PARENTERAL at 13:17

## 2021-06-08 RX ADMIN — VANCOMYCIN HYDROCHLORIDE 1750 MG: 1 INJECTION, POWDER, LYOPHILIZED, FOR SOLUTION INTRAVENOUS at 15:12

## 2021-06-08 NOTE — H&P
"Hospitalist History and Physical        Patient Identification  Name: Stacey Diaz  Age/Sex: 46 y.o. female  :  1975        MRN: [id number]  Visit Number: [id number]  Admit Date: 2021   PCP: Torito De La Torre APRN          Chief complaint altered mental status, unresponsiveness    History of Present Illness:  Patient is a 46 y.o. female with history of stroke, HTN, headache, arthritis, and \"kidney disease\" though most recent records in our system from  show normal renal function, who was brought to the ED for evaluation of altered mental status and unresponsiveness. Per her , she woke up feeling \"out of sorts\" this morning. Later in the morning he found her unresponsive sitting in a chair, which is how EMS reportedly found her as well. She had a pulse upon arrival to the ED and EMS reported that she maintained her vitals and her airway while en route. She became hypotensive while in the ED however. She was also febrile up to 101.9 F and tachycardic. O2 sat was 79% UDS was positive for benzodiazepines, opiates/oxycodone and THC. Upon further questioning of the  by ED staff, he reported that patient is prescribed percocet and was previously on benzodiazepines but when her PCP cut her off, she started buying them off the street. Patient was 79% on NRB mask and ABG showed severe hypoxia with PO2 41.9 and O2 sat 79% on 2L NC. She was given narcan with reported improvement in responsiveness. O2 sat reportedly came up to the upper 80s on 4L NC per repeat ABG and has since improved to the mid 90s on 4L NC. She was given IV fluid bolus which seemed to help with BP though it has remained somewhat marginal since arrival and thus a central line was placed by the ED physician. Lab workup revealed creatinine elevated at 1.93, BUN 28, albumin only 2.82, TSH 0.722 with free T4 mildly low at 0.88, ammonia mildly elevated at 55, CRP significantly elevated at 25 and WBC elevated at 14.88 but with " normal lactate, and elevated d-dimer of 0.64. Acetaminophen and salicylate levels were undetectable. UA showed large blood, positive nitrite and moderate leuk esterase, TNTC RBC and TNTC WBC with 4+ bacteria suggesting UTI, though 13-20 squamous epithelial cells were present suggesting component of contamination as well. CT head and abdomen showed no acute abnormalities while CT chest showed left lingular pneumonia and patchy bilateral groundglass airspace disease that could represent chronic inflammatory or fibrotic process. Patient has been admitted to the CCU for further management. During my interview and exam in the CCU, patient is somnolent. She will open her eyes to loud voice and noxious stimuli and will say a few words from time to time but cannot wake up enough to give any reliable history. Current BP is 107/64, sat 92% on 4L NC, RR 16 and pulse 80.      Review of Systems  Review of Systems   Unable to perform ROS: Mental status change       History  Past Medical History:   Diagnosis Date   • Arthritis    • Headache    • Hypertension    • Kidney disease    • Kidney disease    • Stroke (CMS/HCC)      Past Surgical History:   Procedure Laterality Date   • APPENDECTOMY     • COLON RESECTION SMALL BOWEL     • GALLBLADDER SURGERY     • HYSTERECTOMY       No family history on file.  Social History     Tobacco Use   • Smoking status: Never Smoker   • Smokeless tobacco: Never Used   Substance Use Topics   • Alcohol use: Yes   • Drug use: Never     (Not in a hospital admission)    Allergies:  Nsaids and Latex    Objective     Vital Signs  Temp:  [99.8 °F (37.7 °C)-101.9 °F (38.8 °C)] 99.8 °F (37.7 °C)  Heart Rate:  [] 84  Resp:  [10-18] 14  BP: ()/(35-89) 86/40  Body mass index is 30.49 kg/m².    Physical Exam:  Physical Exam  Constitutional:       Comments: Somnolent, will open eyes to loud voice and noxious stimuli and will speak a few words from time to time, not waking up enough to give reliable  history or to follow simple commands.    HENT:      Head: Normocephalic.      Comments: Scar on left side of face temporo-maxillary region     Nose: Nose normal.      Mouth/Throat:      Mouth: Mucous membranes are dry.      Pharynx: Oropharynx is clear.   Eyes:      Extraocular Movements: Extraocular movements intact.      Conjunctiva/sclera: Conjunctivae normal.      Pupils: Pupils are equal, round, and reactive to light.   Cardiovascular:      Rate and Rhythm: Normal rate and regular rhythm.      Pulses: Normal pulses.      Heart sounds: Normal heart sounds. No murmur heard.     Pulmonary:      Comments: Rhonchorous breath sounds bilaterally which could be as much from snoring as from true lung pathology.  Abdominal:      General: Abdomen is flat. Bowel sounds are normal. There is no distension.      Palpations: Abdomen is soft.   Musculoskeletal:         General: No swelling.      Cervical back: Normal range of motion and neck supple. No tenderness.      Right lower leg: No edema.      Left lower leg: No edema.   Lymphadenopathy:      Cervical: No cervical adenopathy.   Skin:     General: Skin is warm and dry.      Capillary Refill: Capillary refill takes less than 2 seconds.      Comments: A few scattered scabs on legs   Neurological:      General: No focal deficit present.      Comments: Somnolent but will open eyes to loud voice and noxious stimuli, will speak a few words at times but not awake enough to give reliable history. Not following simple commands. Seems to be moving all extremities at times spontaneously, however.   Psychiatric:      Comments: Unable to assess due to somnolence           Results Review:       Lab Results:  Results from last 7 days   Lab Units 06/08/21  1443   WBC 10*3/mm3 14.88*   HEMOGLOBIN g/dL 12.6   PLATELETS 10*3/mm3 125*     Results from last 7 days   Lab Units 06/08/21  1457   CRP mg/dL 25.29*     Results from last 7 days   Lab Units 06/08/21  1443   SODIUM mmol/L 135*    POTASSIUM mmol/L 4.0   CHLORIDE mmol/L 97*   CO2 mmol/L 28.8   BUN mg/dL 28*   CREATININE mg/dL 1.93*   CALCIUM mg/dL 7.9*   GLUCOSE mg/dL 196*     Results from last 7 days   Lab Units 06/08/21  1443   MAGNESIUM mg/dL 1.8     No results found for: HGBA1C  Results from last 7 days   Lab Units 06/08/21  1443   BILIRUBIN mg/dL 0.5   ALK PHOS U/L 45   AST (SGOT) U/L 13   ALT (SGPT) U/L 23     Results from last 7 days   Lab Units 06/08/21  1443   CK TOTAL U/L 109   TROPONIN T ng/mL 0.017         Results from last 7 days   Lab Units 06/08/21  1456   INR  0.95     Results from last 7 days   Lab Units 06/08/21  1700   PH, ARTERIAL pH units 7.435   PO2 ART mm Hg 56.3*   PCO2, ARTERIAL mm Hg 42.9   HCO3 ART mmol/L 28.8*       I have reviewed the patient's laboratory results.    Imaging:  Imaging Results (Last 72 Hours)     Procedure Component Value Units Date/Time    XR Chest 1 View [[id number]] Collected: 06/08/21 1859     Updated: 06/08/21 1902    Narrative:      EXAM:    XR Chest, 1 View     EXAM DATE:    6/8/2021 6:34 PM     CLINICAL HISTORY:    POST CENTRAL LINE PLACE INTERNAL JUGULAR; J18.9-Pneumonia, unspecified  organism; N30.01-Acute cystitis with hematuria; A41.9-Sepsis,  unspecified organism; R65.21-Severe sepsis with septic shock     TECHNIQUE:    Frontal view of the chest.     COMPARISON:    06/14/2015     FINDINGS:    LUNGS:  Patchy bibasilar airspace.    PLEURAL SPACE:  Probably tiny bilateral pleural effusions.  No  pneumothorax.    HEART:  Cardiomegaly.    MEDIASTINUM:  Unremarkable.    BONES/JOINTS:  Unremarkable.    TUBES, LINES AND DEVICES:  Right central line with tip in SVC.       Impression:      1.  Cardiomegaly.  2.  Patchy bibasilar airspace.  3.  Probably tiny bilateral pleural effusions.     This report was finalized on 6/8/2021 7:00 PM by Dr. Carlos Leonardo MD.       CT Abdomen Pelvis Without Contrast [[id number]] Collected: 06/08/21 1355     Updated: 06/08/21 1358    Narrative:      EXAM:    CT  Abdomen and Pelvis Without Intravenous Contrast     EXAM DATE:    6/8/2021 1:32 PM     CLINICAL HISTORY:    ams     TECHNIQUE:    Axial computed tomography images of the abdomen and pelvis without  intravenous contrast.  Sagittal and coronal reformatted images were  created and reviewed.  This CT exam was performed using one or more of  the following dose reduction techniques:  automated exposure control,  adjustment of the mA and/or kV according to patient size, and/or use of  iterative reconstruction technique.     COMPARISON:    No relevant prior studies available.     FINDINGS:    LUNG BASES:  Lung bases previously described.      ABDOMEN:    LIVER:  Unremarkable.    GALLBLADDER AND BILE DUCTS:  Cholecystectomy clips.  No ductal  dilation.    PANCREAS:  Unremarkable.  No ductal dilation.    SPLEEN:  Unremarkable.  No splenomegaly.    ADRENALS:  Unremarkable.  No mass.    KIDNEYS AND URETERS:  Unremarkable.  No obstructing stones.  No  hydronephrosis.    STOMACH AND BOWEL:  Abundant stool throughout the colon.  No  obstruction.  No mucosal thickening.      PELVIS:    APPENDIX:  No findings to suggest acute appendicitis.    BLADDER:  Unremarkable.  No stones.    REPRODUCTIVE:  Unremarkable as visualized.      ABDOMEN and PELVIS:    INTRAPERITONEAL SPACE:  Unremarkable.  No free air.  No significant  fluid collection.    BONES/JOINTS:  No acute fracture.  No dislocation.    SOFT TISSUES:  Unremarkable.    VASCULATURE:  Unremarkable.  No abdominal aortic aneurysm.    LYMPH NODES:  Unremarkable.  No enlarged lymph nodes.       Impression:        No acute findings in the abdomen or pelvis.     This report was finalized on 6/8/2021 1:56 PM by Dr. Carlos Leonardo MD.       CT Chest Without Contrast Diagnostic [[id number]] Collected: 06/08/21 1354     Updated: 06/08/21 1357    Narrative:      EXAM:    CT Chest Without Intravenous Contrast     EXAM DATE:    6/8/2021 1:31 PM     CLINICAL HISTORY:    ams     TECHNIQUE:     Axial computed tomography images of the chest without intravenous  contrast.  Sagittal and coronal reformatted images were created and  reviewed.  This CT exam was performed using one or more of the following  dose reduction techniques:  automated exposure control, adjustment of  the mA and/or kV according to patient size, and/or use of iterative  reconstruction technique.     COMPARISON:    No relevant prior studies available.     FINDINGS:    LUNGS:  Left lingular pneumonia.  Patchy bilateral groundglass  airspace disease that is nonspecific but could represent chronic  inflammatory or fibrotic process.    PLEURAL SPACE:  Unremarkable.  No pneumothorax.  No significant  effusion.    HEART:  Unremarkable.  No cardiomegaly.  No significant pericardial  effusion.    BONES/JOINTS:  Unremarkable.  No acute fracture.  No dislocation.    SOFT TISSUES:  Unremarkable.    VASCULATURE:  Unremarkable.  No thoracic aortic aneurysm.    LYMPH NODES:  Unremarkable.  No enlarged lymph nodes.       Impression:      1.  Left lingular pneumonia.  2.  Patchy bilateral groundglass airspace disease that is nonspecific  but could represent chronic inflammatory or fibrotic process.     This report was finalized on 6/8/2021 1:55 PM by Dr. Carlos Leonardo MD.       CT Head Without Contrast [[id number]] Collected: 06/08/21 1333     Updated: 06/08/21 1346    Narrative:      EXAM:    CT Head Without Intravenous Contrast     EXAM DATE:    6/8/2021 1:30 PM     CLINICAL HISTORY:    ams     TECHNIQUE:    Axial computed tomography images of the head/brain without intravenous  contrast.  Sagittal and coronal reformatted images were created and  reviewed.  This CT exam was performed using one or more of the following  dose reduction techniques:  automated exposure control, adjustment of  the mA and/or kV according to patient size, and/or use of iterative  reconstruction technique.     COMPARISON:    No relevant prior studies available.     FINDINGS:     BRAIN:  Unremarkable.  No hemorrhage.  No significant white matter  disease.  No edema.    VENTRICLES:  Unremarkable.  No ventriculomegaly.    BONES/JOINTS:  Unremarkable.  No acute fracture.    SOFT TISSUES:  Unremarkable.    SINUSES:  Unremarkable as visualized.  No acute sinusitis.    MASTOID AIR CELLS:  Unremarkable as visualized.  No mastoid effusion.       Impression:        Unremarkable exam demonstrating no CT evidence of acute intracranial  findings.     This report was finalized on 6/8/2021 1:33 PM by Dr. Carlos Leonardo MD.             I have personally reviewed the patient's radiologic imaging.        EKG: Sinus tachycardia with occasional PVC's, , QTc 405. Biatrial enlargement. No overt ST changes to suggest acute ischemia appreciated.     I have personally reviewed the patient's EKG.        Assessment/Plan     - Severe sepsis (CMS/HCC), present on admission with fever, tachycardia, hypotension that has thus far responded to IV fluid hydration though still marginal, leukocytosis, CRP elevation and MARCIN, felt to be secondary to left lingular pneumonia and also possible UTI though presence of large number of squamous epithelial cells raises concern for component of contamination: admitted to the CCU. Treat with IV vancomycin, cefepime and flagyl for now. MRSA nasal swab pending; if negative then can discontinue vancomycin. Cefepime and flagyl should adequately cover for community acquired and potentially aspiration pneumonia given multiple sedating medications and unresponsiveness that improved with narcan suggesting high risk for aspiration, as well as UTI, while awaiting urine and blood culture results. Send respiratory PCR to rule out atypical organisms. Continue to trend WBC, CRP.   - MARCIN: hydrate with IV fluids. Review home meds once reconciled by pharmacy and hold any potentially nephrotoxic agents. Monitor strict I/O's. If BP drops despite aggressive IV fluid challenge, will initiate  vasopressors to improve renal perfusion.   - Acute hypoxic respiratory failure: felt to be multifactorial, secondary to severe sepsis and pneumonia above along with potentially sedating effects of multiple medications (opiates, benzodiazepines, THC) in her system. The fact that responsiveness and oxygenation improved with administration of narcan supports this. Continue supplemental oxygen, currently at 4L NC, titrate to keep O2 sat in low 90s.   - Acute encephalopathy: likely multifactorial from severe sepsis, pneumonia, acute hypoxic respiratory failure, and sedating effects of multiple medications present on UDS. Ammonia level was slightly elevated but significance of this is uncertain and patient has no documented history of cirrhosis. Again, improved some with narcan. No MAGGIE on file at this time to determine whether these medications are prescribed to the patient. Per 's account to ED physician, however, patient is prescribed percocet but now buys benzodiazepines off the street after being cut off by her PCP. Continue to monitor closely. Neuro checks q2h. Patient is still very somnolent on my exam in the CCU, so will give additional dose of narcan. Apprehensive about giving romazicon in setting of chronic benzodiazepine use, however, due to concerns for triggering seizures. Will leave this to discretion of AICU.   - Positive d-dimer: Suspect likely reactive in nature from severe sepsis, but VQ scan and venous doppler ordered to rule out VTE.   - Mild thrombocytopenia: monitor closely, especially while receiving lovenox for DVT prophylaxis. With mildly low platelets and mildly elevated ammonia level, will screen for viral hepatitis to look for any potential source of underlying liver dysfunction. INR is normal, however, which would argue against impaired hepatic function.     DVT Prophylaxis: SQ Lovenox    Estimated Length of Stay >2 midnights    I discussed the patient's findings, assessment and plan  with nursing staff who were present during my interview and exam in the CCU.    * patient is high risk due to severe sepsis, left lingular pneumonia, possible UTI, MARCIN, acute hypoxic respiratory failure, acute encephalopathy likely multifactorial, concern for polypharmacy, positive d-dimer    Atul Medellin MD  06/08/21  19:08 EDT

## 2021-06-08 NOTE — ED NOTES
I attempted 4 times to get pts blood and was unsuccessful. Im going to see if rt therapist will try     Lucy Villanueva  06/08/21 1898

## 2021-06-08 NOTE — ED NOTES
Kiki in Laboratory notified of need to stick patient for labs.     Leah Giron, RN  06/08/21 0011

## 2021-06-08 NOTE — ED NOTES
Central line placement confirmed by Dr. Delgadillo per port chest at this time.     Nayeli Morley, RN  06/08/21 7106

## 2021-06-08 NOTE — ED NOTES
Dr warner awre of blood pressure trending down. Dr warner requests CVC set up. All supplies at bedside. Family at bedside and aware of CVC placement.     Harjit Caldera, RN  06/08/21 3884

## 2021-06-08 NOTE — ED NOTES
I spoke with gissel from [facility] and he is going to come help me try to get the second culture.      Lucy Villanueva  06/08/21 5341

## 2021-06-08 NOTE — ED PROVIDER NOTES
Subjective   History of Present Illness  This 46-year-old female was brought to the emergency department for evaluation of altered mental status status and unresponsiveness.  She has a history of stroke arthritis hypertension headache chronic kidney disease.  History of appendectomy colon resection cholecystectomy and hysterectomy.  She is allergic to NSAIDs and latex.   said that she woke up feeling out of sorts this morning.  Nurse reports that has been said he then went into the living room a few minutes later is not sure how long it found her unresponsive.  When EMS arrived they found her unresponsive sitting in a chair.  When she arrived to the emergency department patient had a pulse.  The nurse does not report any cardiac arrest he said that she really maintained her vitals and her airway while in route.    Review of Systems   Unable to perform ROS: Mental status change       Past Medical History:   Diagnosis Date   • Arthritis    • Headache    • Hypertension    • Kidney disease    • Kidney disease    • Stroke (CMS/[facility])        Allergies   Allergen Reactions   • Nsaids Other (See Comments)   • Latex Rash       Past Surgical History:   Procedure Laterality Date   • APPENDECTOMY     • COLON RESECTION SMALL BOWEL     • GALLBLADDER SURGERY     • HYSTERECTOMY         History reviewed. No pertinent family history.    Social History     Socioeconomic History   • Marital status:      Spouse name: Not on file   • Number of children: Not on file   • Years of education: Not on file   • Highest education level: Not on file   Tobacco Use   • Smoking status: Current Every Day Smoker     Packs/day: 1.50     Years: 15.00     Pack years: 22.50     Types: Cigarettes   • Smokeless tobacco: Never Used   Substance and Sexual Activity   • Alcohol use: Yes   • Drug use: Never   • Sexual activity: Defer           Objective   Physical Exam  Vitals and nursing note reviewed. Exam conducted with a chaperone present.    Constitutional:       Appearance: Normal appearance.   HENT:      Head: Normocephalic and atraumatic.      Right Ear: External ear normal.      Left Ear: External ear normal.      Nose: Nose normal.      Mouth/Throat:      Mouth: Mucous membranes are moist.      Pharynx: Oropharynx is clear.   Eyes:      Extraocular Movements: Extraocular movements intact.      Pupils: Pupils are equal, round, and reactive to light.   Cardiovascular:      Rate and Rhythm: Normal rate and regular rhythm.      Pulses: Normal pulses.      Heart sounds: Normal heart sounds.   Pulmonary:      Effort: Pulmonary effort is normal.      Breath sounds: Normal breath sounds.   Abdominal:      General: Abdomen is flat. Bowel sounds are normal.      Palpations: Abdomen is soft.   Musculoskeletal:         General: Normal range of motion.      Cervical back: Normal range of motion and neck supple.   Skin:     General: Skin is warm and dry.      Capillary Refill: Capillary refill takes less than 2 seconds.   Neurological:      General: No focal deficit present.      Mental Status: She is alert and oriented to person, place, and time.      GCS: GCS eye subscore is 3. GCS verbal subscore is 2. GCS motor subscore is 4.      Comments: Patient is spontaneously moving all 4 extremities.  Her pupils are not pinpoint however they are not reactive to light.  She has spontaneous eye movements.  Physical examination limited affect patient decreased alertness.   Psychiatric:         Mood and Affect: Mood normal.         Behavior: Behavior normal.         Thought Content: Thought content normal.         Judgment: Judgment normal.         Central Line At Bedside    Date/Time: 6/8/2021 6:29 PM  Performed by: Laureano Delgadillo DO  Authorized by: Laureano Delgadillo DO     Consent:     Consent obtained:  Verbal and written    Consent given by:  Patient    Risks discussed:  Arterial puncture, incorrect placement, bleeding, infection, nerve damage and  pneumothorax    Alternatives discussed:  No treatment, delayed treatment, alternative treatment and observation  Pre-procedure details:     Hand hygiene: Hand hygiene performed prior to insertion      Sterile barrier technique: All elements of maximal sterile technique followed      Skin preparation:  2% chlorhexidine  Anesthesia (see MAR for exact dosages):     Anesthesia method:  Local infiltration    Local anesthetic:  Lidocaine 1% w/o epi  Procedure details:     Location:  R internal jugular    Patient position:  Flat    Procedural supplies:  Triple lumen    Catheter size:  7.5 Fr    Landmarks identified: yes      Ultrasound guidance: yes      Sterile ultrasound techniques: Sterile gel and sterile probe covers were used      Number of attempts:  1    Successful placement: yes    Post-procedure details:     Post-procedure:  Dressing applied and line sutured    Assessment:  Blood return through all ports, free fluid flow, no pneumothorax on x-ray and placement verified by x-ray    Patient tolerance of procedure:  Tolerated well, no immediate complications               ED Course  ED Course as of Jun 12 1006   Tue Jun 08, 2021   1455 She has a large left pneumonia likely accounting for her hypoxia.    [JM]   1643 Ammonia(!): 55 [JM]   1738 Dr sabillon has accepted the patient for admission here.     [JM]   1829 Xray is at bedside to confirm central line placement.    [JM]      ED Course User Index  [JM] Laureano Delgadillo, DO                                           MDM  Number of Diagnoses or Management Options  Acute cystitis with hematuria: new and requires workup  Pneumonia of left lung due to infectious organism, unspecified part of lung: new and requires workup  Septic shock (CMS/HCC): new and requires workup     Amount and/or Complexity of Data Reviewed  Clinical lab tests: reviewed and ordered  Tests in the radiology section of CPT®: reviewed and ordered  Tests in the medicine section of CPT®: reviewed and  ordered  Decide to obtain previous medical records or to obtain history from someone other than the patient: yes  Obtain history from someone other than the patient: yes  Review and summarize past medical records: yes  Independent visualization of images, tracings, or specimens: yes        Final diagnoses:   Pneumonia of left lung due to infectious organism, unspecified part of lung   Acute cystitis with hematuria   Septic shock (CMS/[facility])       ED Disposition  ED Disposition     ED Disposition Condition Comment    Decision to Admit  Level of Care: Critical Care [6]   Diagnosis: Severe sepsis (CMS/[facility]) [[id number]]   Admitting Physician: NED MARTIN [[id number]]   Certification: I Certify That Inpatient Hospital Services Are Medically Necessary For Greater Than 2 Midnights            Torito De La Torre, APRN  [address] [facility] [address]  [phone]               Medication List      No changes were made to your prescriptions during this visit.          Laureano Delgadillo,   06/12/21 1006

## 2021-06-09 ENCOUNTER — APPOINTMENT (OUTPATIENT)
Dept: CARDIOLOGY | Facility: HOSPITAL | Age: 46
End: 2021-06-09

## 2021-06-09 ENCOUNTER — APPOINTMENT (OUTPATIENT)
Dept: ULTRASOUND IMAGING | Facility: HOSPITAL | Age: 46
End: 2021-06-09

## 2021-06-09 ENCOUNTER — APPOINTMENT (OUTPATIENT)
Dept: CT IMAGING | Facility: HOSPITAL | Age: 46
End: 2021-06-09

## 2021-06-09 LAB
027 TOXIN: NORMAL
ADV 40+41 DNA STL QL NAA+NON-PROBE: NOT DETECTED
ALBUMIN SERPL-MCNC: 3.01 G/DL (ref 3.5–5.2)
ALBUMIN/GLOB SERPL: 1.3 G/DL
ALP SERPL-CCNC: 47 U/L (ref 39–117)
ALT SERPL W P-5'-P-CCNC: 22 U/L (ref 1–33)
ANION GAP SERPL CALCULATED.3IONS-SCNC: 8.8 MMOL/L (ref 5–15)
AST SERPL-CCNC: 12 U/L (ref 1–32)
ASTRO TYP 1-8 RNA STL QL NAA+NON-PROBE: NOT DETECTED
BASOPHILS # BLD AUTO: 0.01 10*3/MM3 (ref 0–0.2)
BASOPHILS NFR BLD AUTO: 0.1 % (ref 0–1.5)
BILIRUB SERPL-MCNC: 0.3 MG/DL (ref 0–1.2)
BUN SERPL-MCNC: 18 MG/DL (ref 6–20)
BUN/CREAT SERPL: 22.2 (ref 7–25)
C CAYETANENSIS DNA STL QL NAA+NON-PROBE: NOT DETECTED
C COLI+JEJ+UPSA DNA STL QL NAA+NON-PROBE: NOT DETECTED
C DIFF TOX GENS STL QL NAA+PROBE: NEGATIVE
CALCIUM SPEC-SCNC: 7.7 MG/DL (ref 8.6–10.5)
CHLORIDE SERPL-SCNC: 100 MMOL/L (ref 98–107)
CK SERPL-CCNC: 77 U/L (ref 20–180)
CO2 SERPL-SCNC: 28.2 MMOL/L (ref 22–29)
CREAT SERPL-MCNC: 0.81 MG/DL (ref 0.57–1)
CRP SERPL-MCNC: 34.38 MG/DL (ref 0–0.5)
CRYPTOSP DNA STL QL NAA+NON-PROBE: NOT DETECTED
DEPRECATED RDW RBC AUTO: 50.8 FL (ref 37–54)
E HISTOLYT DNA STL QL NAA+NON-PROBE: NOT DETECTED
EAEC PAA PLAS AGGR+AATA ST NAA+NON-PRB: NOT DETECTED
EC STX1+STX2 GENES STL QL NAA+NON-PROBE: NOT DETECTED
EOSINOPHIL # BLD AUTO: 0.06 10*3/MM3 (ref 0–0.4)
EOSINOPHIL NFR BLD AUTO: 0.4 % (ref 0.3–6.2)
EPEC EAE GENE STL QL NAA+NON-PROBE: NOT DETECTED
ERYTHROCYTE [DISTWIDTH] IN BLOOD BY AUTOMATED COUNT: 16.4 % (ref 12.3–15.4)
ETEC LTA+ST1A+ST1B TOX ST NAA+NON-PROBE: NOT DETECTED
G LAMBLIA DNA STL QL NAA+NON-PROBE: NOT DETECTED
GFR SERPL CREATININE-BSD FRML MDRD: 76 ML/MIN/1.73
GLOBULIN UR ELPH-MCNC: 2.4 GM/DL
GLUCOSE BLDC GLUCOMTR-MCNC: 258 MG/DL (ref 70–130)
GLUCOSE SERPL-MCNC: 131 MG/DL (ref 65–99)
HCT VFR BLD AUTO: 37.1 % (ref 34–46.6)
HGB BLD-MCNC: 12 G/DL (ref 12–15.9)
IMM GRANULOCYTES # BLD AUTO: 0.06 10*3/MM3 (ref 0–0.05)
IMM GRANULOCYTES NFR BLD AUTO: 0.4 % (ref 0–0.5)
LYMPHOCYTES # BLD AUTO: 1.97 10*3/MM3 (ref 0.7–3.1)
LYMPHOCYTES NFR BLD AUTO: 14.7 % (ref 19.6–45.3)
MAGNESIUM SERPL-MCNC: 1.6 MG/DL (ref 1.6–2.6)
MCH RBC QN AUTO: 27.9 PG (ref 26.6–33)
MCHC RBC AUTO-ENTMCNC: 32.3 G/DL (ref 31.5–35.7)
MCV RBC AUTO: 86.3 FL (ref 79–97)
MONOCYTES # BLD AUTO: 0.37 10*3/MM3 (ref 0.1–0.9)
MONOCYTES NFR BLD AUTO: 2.8 % (ref 5–12)
NEUTROPHILS NFR BLD AUTO: 10.97 10*3/MM3 (ref 1.7–7)
NEUTROPHILS NFR BLD AUTO: 81.6 % (ref 42.7–76)
NOROVIRUS GI+II RNA STL QL NAA+NON-PROBE: NOT DETECTED
NRBC BLD AUTO-RTO: 0 /100 WBC (ref 0–0.2)
P SHIGELLOIDES DNA STL QL NAA+NON-PROBE: NOT DETECTED
PHOSPHATE SERPL-MCNC: 2.2 MG/DL (ref 2.5–4.5)
PLATELET # BLD AUTO: 135 10*3/MM3 (ref 140–450)
PMV BLD AUTO: 8.9 FL (ref 6–12)
POTASSIUM SERPL-SCNC: 3.8 MMOL/L (ref 3.5–5.2)
PROT SERPL-MCNC: 5.4 G/DL (ref 6–8.5)
QT INTERVAL: 292 MS
QTC INTERVAL: 405 MS
RBC # BLD AUTO: 4.3 10*6/MM3 (ref 3.77–5.28)
RVA RNA STL QL NAA+NON-PROBE: NOT DETECTED
S ENT+BONG DNA STL QL NAA+NON-PROBE: NOT DETECTED
SAPO I+II+IV+V RNA STL QL NAA+NON-PROBE: NOT DETECTED
SHIGELLA SP+EIEC IPAH ST NAA+NON-PROBE: NOT DETECTED
SODIUM SERPL-SCNC: 137 MMOL/L (ref 136–145)
TROPONIN T SERPL-MCNC: <0.01 NG/ML (ref 0–0.03)
V CHOL+PARA+VUL DNA STL QL NAA+NON-PROBE: NOT DETECTED
V CHOLERAE DNA STL QL NAA+NON-PROBE: NOT DETECTED
WBC # BLD AUTO: 13.44 10*3/MM3 (ref 3.4–10.8)
Y ENTEROCOL DNA STL QL NAA+NON-PROBE: NOT DETECTED

## 2021-06-09 PROCEDURE — 86140 C-REACTIVE PROTEIN: CPT | Performed by: INTERNAL MEDICINE

## 2021-06-09 PROCEDURE — 84100 ASSAY OF PHOSPHORUS: CPT | Performed by: INTERNAL MEDICINE

## 2021-06-09 PROCEDURE — 94799 UNLISTED PULMONARY SVC/PX: CPT

## 2021-06-09 PROCEDURE — 93970 EXTREMITY STUDY: CPT | Performed by: RADIOLOGY

## 2021-06-09 PROCEDURE — 0 IOPAMIDOL PER 1 ML: Performed by: INTERNAL MEDICINE

## 2021-06-09 PROCEDURE — 84484 ASSAY OF TROPONIN QUANT: CPT | Performed by: HOSPITALIST

## 2021-06-09 PROCEDURE — 25010000002 VANCOMYCIN: Performed by: INTERNAL MEDICINE

## 2021-06-09 PROCEDURE — 25010000002 ENOXAPARIN PER 10 MG: Performed by: INTERNAL MEDICINE

## 2021-06-09 PROCEDURE — 92610 EVALUATE SWALLOWING FUNCTION: CPT

## 2021-06-09 PROCEDURE — 93970 EXTREMITY STUDY: CPT

## 2021-06-09 PROCEDURE — 71275 CT ANGIOGRAPHY CHEST: CPT | Performed by: RADIOLOGY

## 2021-06-09 PROCEDURE — 71275 CT ANGIOGRAPHY CHEST: CPT

## 2021-06-09 PROCEDURE — 82550 ASSAY OF CK (CPK): CPT | Performed by: INTERNAL MEDICINE

## 2021-06-09 PROCEDURE — 85025 COMPLETE CBC W/AUTO DIFF WBC: CPT | Performed by: INTERNAL MEDICINE

## 2021-06-09 PROCEDURE — 87493 C DIFF AMPLIFIED PROBE: CPT | Performed by: INTERNAL MEDICINE

## 2021-06-09 PROCEDURE — 80053 COMPREHEN METABOLIC PANEL: CPT | Performed by: INTERNAL MEDICINE

## 2021-06-09 PROCEDURE — 25010000003 MAGNESIUM SULFATE 4 GM/100ML SOLUTION: Performed by: INTERNAL MEDICINE

## 2021-06-09 PROCEDURE — 83735 ASSAY OF MAGNESIUM: CPT | Performed by: INTERNAL MEDICINE

## 2021-06-09 PROCEDURE — 82962 GLUCOSE BLOOD TEST: CPT

## 2021-06-09 PROCEDURE — 25010000002 CEFEPIME PER 500 MG: Performed by: HOSPITALIST

## 2021-06-09 PROCEDURE — 0097U HC BIOFIRE FILMARRAY GI PANEL: CPT | Performed by: INTERNAL MEDICINE

## 2021-06-09 PROCEDURE — 99233 SBSQ HOSP IP/OBS HIGH 50: CPT | Performed by: INTERNAL MEDICINE

## 2021-06-09 RX ORDER — FAMOTIDINE 20 MG/1
20 TABLET, FILM COATED ORAL
Status: DISCONTINUED | OUTPATIENT
Start: 2021-06-09 | End: 2021-06-10 | Stop reason: HOSPADM

## 2021-06-09 RX ORDER — NICOTINE 21 MG/24HR
1 PATCH, TRANSDERMAL 24 HOURS TRANSDERMAL
Status: DISCONTINUED | OUTPATIENT
Start: 2021-06-09 | End: 2021-06-10 | Stop reason: HOSPADM

## 2021-06-09 RX ORDER — POTASSIUM CHLORIDE 1.5 G/1.77G
40 POWDER, FOR SOLUTION ORAL AS NEEDED
Status: DISCONTINUED | OUTPATIENT
Start: 2021-06-09 | End: 2021-06-10 | Stop reason: HOSPADM

## 2021-06-09 RX ORDER — POTASSIUM CHLORIDE 7.45 MG/ML
10 INJECTION INTRAVENOUS
Status: DISCONTINUED | OUTPATIENT
Start: 2021-06-09 | End: 2021-06-10 | Stop reason: HOSPADM

## 2021-06-09 RX ORDER — ONDANSETRON 4 MG/1
8 TABLET, FILM COATED ORAL EVERY 8 HOURS PRN
Status: DISCONTINUED | OUTPATIENT
Start: 2021-06-09 | End: 2021-06-10 | Stop reason: HOSPADM

## 2021-06-09 RX ORDER — LEVOTHYROXINE SODIUM 0.03 MG/1
25 TABLET ORAL DAILY
Status: DISCONTINUED | OUTPATIENT
Start: 2021-06-09 | End: 2021-06-10 | Stop reason: HOSPADM

## 2021-06-09 RX ORDER — MAGNESIUM SULFATE HEPTAHYDRATE 40 MG/ML
2 INJECTION, SOLUTION INTRAVENOUS AS NEEDED
Status: DISCONTINUED | OUTPATIENT
Start: 2021-06-09 | End: 2021-06-10 | Stop reason: HOSPADM

## 2021-06-09 RX ORDER — MAGNESIUM SULFATE HEPTAHYDRATE 40 MG/ML
4 INJECTION, SOLUTION INTRAVENOUS AS NEEDED
Status: DISCONTINUED | OUTPATIENT
Start: 2021-06-09 | End: 2021-06-10 | Stop reason: HOSPADM

## 2021-06-09 RX ORDER — PANTOPRAZOLE SODIUM 40 MG/1
40 TABLET, DELAYED RELEASE ORAL DAILY
Status: DISCONTINUED | OUTPATIENT
Start: 2021-06-09 | End: 2021-06-09 | Stop reason: ALTCHOICE

## 2021-06-09 RX ORDER — MAGNESIUM SULFATE HEPTAHYDRATE 40 MG/ML
4 INJECTION, SOLUTION INTRAVENOUS ONCE
Status: COMPLETED | OUTPATIENT
Start: 2021-06-09 | End: 2021-06-09

## 2021-06-09 RX ORDER — L.ACID,PARA/B.BIFIDUM/S.THERM 8B CELL
1 CAPSULE ORAL DAILY
Status: DISCONTINUED | OUTPATIENT
Start: 2021-06-09 | End: 2021-06-10 | Stop reason: HOSPADM

## 2021-06-09 RX ORDER — POTASSIUM CHLORIDE 750 MG/1
40 TABLET, FILM COATED, EXTENDED RELEASE ORAL AS NEEDED
Status: DISCONTINUED | OUTPATIENT
Start: 2021-06-09 | End: 2021-06-10 | Stop reason: HOSPADM

## 2021-06-09 RX ADMIN — POTASSIUM PHOSPHATE, MONOBASIC AND POTASSIUM PHOSPHATE, DIBASIC 15 MMOL: 224; 236 INJECTION, SOLUTION, CONCENTRATE INTRAVENOUS at 09:30

## 2021-06-09 RX ADMIN — FAMOTIDINE 20 MG: 20 TABLET, FILM COATED ORAL at 18:16

## 2021-06-09 RX ADMIN — NICOTINE 1 PATCH: 14 PATCH, EXTENDED RELEASE TRANSDERMAL at 15:37

## 2021-06-09 RX ADMIN — METRONIDAZOLE 500 MG: 500 INJECTION, SOLUTION INTRAVENOUS at 06:10

## 2021-06-09 RX ADMIN — SODIUM CHLORIDE, PRESERVATIVE FREE 10 ML: 5 INJECTION INTRAVENOUS at 23:12

## 2021-06-09 RX ADMIN — SODIUM CHLORIDE 125 ML/HR: 9 INJECTION, SOLUTION INTRAVENOUS at 06:11

## 2021-06-09 RX ADMIN — SODIUM CHLORIDE, PRESERVATIVE FREE 10 ML: 5 INJECTION INTRAVENOUS at 09:31

## 2021-06-09 RX ADMIN — SODIUM CHLORIDE, PRESERVATIVE FREE 10 ML: 5 INJECTION INTRAVENOUS at 00:20

## 2021-06-09 RX ADMIN — LEVOTHYROXINE SODIUM 25 MCG: 25 TABLET ORAL at 15:36

## 2021-06-09 RX ADMIN — Medication 1 CAPSULE: at 15:36

## 2021-06-09 RX ADMIN — VANCOMYCIN HYDROCHLORIDE 1250 MG: 10 INJECTION, POWDER, LYOPHILIZED, FOR SOLUTION INTRAVENOUS at 15:37

## 2021-06-09 RX ADMIN — ENOXAPARIN SODIUM 40 MG: 40 INJECTION SUBCUTANEOUS at 00:19

## 2021-06-09 RX ADMIN — METRONIDAZOLE 500 MG: 500 INJECTION, SOLUTION INTRAVENOUS at 23:11

## 2021-06-09 RX ADMIN — IPRATROPIUM BROMIDE AND ALBUTEROL SULFATE 3 ML: .5; 3 SOLUTION RESPIRATORY (INHALATION) at 18:25

## 2021-06-09 RX ADMIN — FAMOTIDINE 20 MG: 20 TABLET, FILM COATED ORAL at 15:36

## 2021-06-09 RX ADMIN — SODIUM CHLORIDE, PRESERVATIVE FREE 10 ML: 5 INJECTION INTRAVENOUS at 09:32

## 2021-06-09 RX ADMIN — MAGNESIUM SULFATE HEPTAHYDRATE 4 G: 40 INJECTION, SOLUTION INTRAVENOUS at 09:31

## 2021-06-09 RX ADMIN — METRONIDAZOLE 500 MG: 500 INJECTION, SOLUTION INTRAVENOUS at 15:37

## 2021-06-09 RX ADMIN — IOPAMIDOL 100 ML: 755 INJECTION, SOLUTION INTRAVENOUS at 17:40

## 2021-06-09 RX ADMIN — ESTROGENS, CONJUGATED 0.6 MG: 0.3 TABLET, FILM COATED ORAL at 15:36

## 2021-06-09 RX ADMIN — SODIUM CHLORIDE, PRESERVATIVE FREE 10 ML: 5 INJECTION INTRAVENOUS at 00:19

## 2021-06-09 RX ADMIN — CEFEPIME HYDROCHLORIDE 1 G: 1 INJECTION, POWDER, FOR SOLUTION INTRAMUSCULAR; INTRAVENOUS at 18:15

## 2021-06-09 RX ADMIN — CEFEPIME HYDROCHLORIDE 1 G: 1 INJECTION, POWDER, FOR SOLUTION INTRAMUSCULAR; INTRAVENOUS at 06:10

## 2021-06-09 RX ADMIN — SODIUM CHLORIDE, PRESERVATIVE FREE 10 ML: 5 INJECTION INTRAVENOUS at 23:11

## 2021-06-09 NOTE — PLAN OF CARE
Goal Outcome Evaluation:              Outcome Summary: Patient tolerating 2L NC. Remains A&Ox4.  at bedside. Taken for CT scan. Tolerated. No signs of distress at this time. VSS. WCTM

## 2021-06-09 NOTE — THERAPY EVALUATION
Acute Care - Speech Language Pathology   Swallow Initial Evaluation [facility]   CLINICAL DYSPHAGIA ASSESSMENT     Patient Name: Stacey Diaz  : 1975  MRN: [id number]  Today's Date: 2021             Admit Date: 2021    Visit Dx:     ICD-10-CM ICD-9-CM   1. Pneumonia of left lung due to infectious organism, unspecified part of lung  J18.9 486   2. Acute cystitis with hematuria  N30.01 595.0   3. Septic shock (CMS/HCC)  A41.9 038.9    R65.21 785.52     995.92     Patient Active Problem List   Diagnosis   • Degenerative disc disease, cervical   • Severe sepsis (CMS/Formerly [facility])     Past Medical History:   Diagnosis Date   • Arthritis    • Headache    • Hypertension    • Kidney disease    • Kidney disease    • Stroke (CMS/Formerly [facility])      Past Surgical History:   Procedure Laterality Date   • APPENDECTOMY     • COLON RESECTION SMALL BOWEL     • GALLBLADDER SURGERY     • HYSTERECTOMY       Stacey Diaz  is seen at bedside this pm on CCU to assess safety/efficacy of swallowing fnx, determine safest/least restrictive diet tolerance. She is NPO pending this assessment. Her significant other is present at bedside.     Ms. Diaz is admitted to [facility] [place] w/ sepsis related to PNA and possible UTI. She is referred today to r/o aspiration as a contributing factor to PNA.     Social History     Socioeconomic History   • Marital status:      Spouse name: Not on file   • Number of children: Not on file   • Years of education: Not on file   • Highest education level: Not on file   Tobacco Use   • Smoking status: Current Every Day Smoker     Packs/day: 1.50     Years: 15.00     Pack years: 22.50     Types: Cigarettes   • Smokeless tobacco: Never Used   Substance and Sexual Activity   • Alcohol use: Yes   • Drug use: Never   • Sexual activity: Defer      EXAM:    XR Chest, 1 View     EXAM DATE:    2021 6:34 PM     CLINICAL HISTORY:    POST CENTRAL LINE PLACE INTERNAL JUGULAR; J18.9-Pneumonia, unspecified  organism;  N30.01-Acute cystitis with hematuria; A41.9-Sepsis,  unspecified organism; R65.21-Severe sepsis with septic shock     TECHNIQUE:    Frontal view of the chest.     COMPARISON:    06/14/2015     FINDINGS:    LUNGS:  Patchy bibasilar airspace.    PLEURAL SPACE:  Probably tiny bilateral pleural effusions.  No  pneumothorax.    HEART:  Cardiomegaly.    MEDIASTINUM:  Unremarkable.    BONES/JOINTS:  Unremarkable.    TUBES, LINES AND DEVICES:  Right central line with tip in SVC.     IMPRESSION:  1.  Cardiomegaly.  2.  Patchy bibasilar airspace.  3.  Probably tiny bilateral pleural effusions.     This report was finalized on 6/8/2021 7:00 PM by Dr. Carlos Leonardo MD.    Diet Orders (active) (From admission, onward)     Start     Ordered    06/09/21 1530  Diet Soft Texture; Chopped; Thin  Diet Effective Now      06/09/21 1529              She is observed on ra w/o complications.     Pt is positioned upright and centered in bed to accept multiple po presentations of ice chips, solid cracker, puree and thin liquids via spoon, cup and straw. She does not self feed 2/2 fatigue effects, reports that her significant other assists w/ meal prep/setup and po intake at baseline.     Facial/oral structures are symmetrical upon observation. Lingual protrusion reveals no deviation. Oral mucosa are moist, pink, and clean. Secretions are clear, thin, and well controlled. OROM/TISHA is wfl to imitate oral postures. Gag is not assessed. Volitional cough is intact w/ adequate  intensity, clear in quality, non-productive. Voice is adequate in intensity, clear in quality w/ intelligible speech. She is oriented, follow simple directives and participates in simple conversational exchange. She is mildly-moderately fatigued in general.     Upon po presentations, adequate bolus anticipation and acceptance w/ good labial seal for bolus clearance via spoon bowl, cup rim stability and suction via straw. Bolus formation, manipulation and control are wfl w/  rotary mastication pattern. A-p transit is timely w/o oral residue. No overt s/s aspiration evidenced before the swallow.     Pharyngeal swallow is timely w/ adequate hyolaryngeal elevation per palpation. No overt s/s aspiration evidenced across this assessment. No silent aspiration suspected as pt is w/o changes in vocal quality, respirations or secretions post po presentations. Pt denies odynophagia.    Impression: Per this assessment, Ms. Diaz presents w/ wfl oropharyngeal swallow w/o s/s aspiration. No s/s indicative of silent aspiration. No odynophagia reported.      She does request diet modification w/ chopped meats 2/2 fatigued effects. May upgrade to regular consistency as tolerated.     EDUCATION  The patient has been educated in the following areas:   Dysphagia (Swallowing Impairment).    SLP Recommendation and Plan  1. Mechanical soft, chopped meats, thin liquids.    2. Meds whole in puree/thins.   3. Upright and centered for all po intake.  4. JULIANNE precautions.  5. Oral care protocol.  No further formal SLP f/u warranted at this time.    D/w pt results and recommendations w/ verbal agreement.    D/w RNDebra, results and recommendations w/ verbal agreement.    Thank you for allowing me to participate in the care of your patient-  Lianet Álvarez M.A., CCC-SLP     Time Calculation:       Therapy Charges for Today     Code Description Service Date Service Provider Modifiers Qty    [id number]  ST EVAL ORAL PHARYNG SWALLOW 4 6/9/2021 Lianet Álvarez MA,CCC-SLP GN 1        Patient was not wearing a face mask during this therapy encounter. Therapist used appropriate personal protective equipment including mask, eye protection and gloves.  Mask used was standard procedure mask. Appropriate PPE was worn during the entire therapy session. The patient coughed during this evaluation. Therapist was within 6 feet for 15 minutes or more during the evaluation. Hand hygiene was completed before and after  therapy session. Patient is not in enhanced droplet precautions.       Lianet Álvarez MA,CCC-SLP  6/9/2021

## 2021-06-09 NOTE — PROGRESS NOTES
Subjective     History:   Stacey Diaz is a 46 y.o. female admitted on 6/8/2021 secondary to <principal problem not specified>     Procedures:   6/8/21: Right IJ central line placement     CC: Follow up sepsis     Patient seen and examined with GOMEZ Richardson. Awake and alert. Reports productive cough and dyspnea. No reported CP. No reported nausea or vomiting. Diarrhea reported overnight and this AM. Pt reports chronic pain and requests pain medication during the encounter. BP improved with stable UOP overnight. No acute events overnight per RN.     History taken from: patient, chart, and RN.      Objective     Vital Signs  Temp:  [98.9 °F (37.2 °C)-101.9 °F (38.8 °C)] 99.4 °F (37.4 °C)  Heart Rate:  [] 108  Resp:  [10-21] 21  BP: ()/(35-95) 126/70    Intake/Output Summary (Last 24 hours) at 6/9/2021 1212  Last data filed at 6/9/2021 0611  Gross per 24 hour   Intake 4866.67 ml   Output 1350 ml   Net 3516.67 ml         Physical Exam:  General:    Awake, alert, answers questions appropriately, in no acute distress, ill appearing    Heart:      Normal S1 and S2. Tachycardic. No significant murmur, rubs or gallops appreciated.   Lungs:     Respirations regular, even and unlabored. Diminished breath sounds throughout. No wheezes, rales or rhonchi.   Abdomen:   Soft and nontender. No guarding, rebound tenderness or  organomegaly noted. Bowel sounds present x 4.   Extremities:  No clubbing, cyanosis or edema noted. Moves UE and LE equally B/L.     Results Review:    Results from last 7 days   Lab Units 06/09/21  0023 06/08/21  1443   WBC 10*3/mm3 13.44* 14.88*   HEMOGLOBIN g/dL 12.0 12.6   PLATELETS 10*3/mm3 135* 125*     Results from last 7 days   Lab Units 06/09/21  0023 06/08/21  1443   SODIUM mmol/L 137 135*   POTASSIUM mmol/L 3.8 4.0   CHLORIDE mmol/L 100 97*   CO2 mmol/L 28.2 28.8   BUN mg/dL 18 28*   CREATININE mg/dL 0.81 1.93*   CALCIUM mg/dL 7.7* 7.9*   GLUCOSE mg/dL 131* 196*     Results from last 7  days   Lab Units 06/09/21  0023 06/08/21  1443   BILIRUBIN mg/dL 0.3 0.5   ALK PHOS U/L 47 45   AST (SGOT) U/L 12 13   ALT (SGPT) U/L 22 23     Results from last 7 days   Lab Units 06/09/21  0023 06/08/21  1443   MAGNESIUM mg/dL 1.6 1.8     Results from last 7 days   Lab Units 06/08/21  1456   INR  0.95     Results from last 7 days   Lab Units 06/09/21  0023 06/08/21 2017 06/08/21  1443   CK TOTAL U/L 77  --  109   TROPONIN T ng/mL <0.010 <0.010 0.017       Imaging Results (Last 24 Hours)     Procedure Component Value Units Date/Time    XR Chest 1 View [[id number]] Collected: 06/08/21 1859     Updated: 06/08/21 1902    Narrative:      EXAM:    XR Chest, 1 View     EXAM DATE:    6/8/2021 6:34 PM     CLINICAL HISTORY:    POST CENTRAL LINE PLACE INTERNAL JUGULAR; J18.9-Pneumonia, unspecified  organism; N30.01-Acute cystitis with hematuria; A41.9-Sepsis,  unspecified organism; R65.21-Severe sepsis with septic shock     TECHNIQUE:    Frontal view of the chest.     COMPARISON:    06/14/2015     FINDINGS:    LUNGS:  Patchy bibasilar airspace.    PLEURAL SPACE:  Probably tiny bilateral pleural effusions.  No  pneumothorax.    HEART:  Cardiomegaly.    MEDIASTINUM:  Unremarkable.    BONES/JOINTS:  Unremarkable.    TUBES, LINES AND DEVICES:  Right central line with tip in SVC.       Impression:      1.  Cardiomegaly.  2.  Patchy bibasilar airspace.  3.  Probably tiny bilateral pleural effusions.     This report was finalized on 6/8/2021 7:00 PM by Dr. Carlos Leonardo MD.               Medications:  cefepime, 1 g, Intravenous, Q12H  enoxaparin, 40 mg, Subcutaneous, Q24H  estrogens (conjugated), 0.6 mg, Oral, Daily  famotidine, 20 mg, Oral, BID AC  ipratropium-albuterol, 3 mL, Nebulization, 4x Daily - RT  lactobacillus acidophilus, 1 capsule, Oral, Daily  levothyroxine, 25 mcg, Oral, Daily  magnesium sulfate, 4 g, Intravenous, Once  metroNIDAZOLE, 500 mg, Intravenous, Q8H  potassium phosphate, 15 mmol, Intravenous, Once  sodium  chloride, 10 mL, Intravenous, Q12H  sodium chloride, 10 mL, Intravenous, Q12H  sodium chloride, 10 mL, Intravenous, Q12H  sodium chloride, 10 mL, Intravenous, Q12H  sodium chloride, 10 mL, Intravenous, Q12H  vancomycin, 1,250 mg, Intravenous, Q12H      Pharmacy Consult - Pharmacy to dose,   Pharmacy Consult - Pharmacy to dose,   Pharmacy Consult,   sodium chloride, 100 mL/hr, Last Rate: 100 mL/hr (06/09/21 0932)            Assessment/Plan   Severe sepsis: Likely 2/2 pneumonia and UTI. Febrile upon presentation. BP initially low but improved with IV fluids. WBC elevated but mildly improved this AM. CRP has risen. Lactate is normal. Blood cultures are pending. Cont Vanc, Cefepime and Flagyl. Cont IV fluids, follow cultures and repeat labs in the AM.     Left-sided pneumonia: Pt is at high risk for aspiration. MRSA PCR is negative. Respiratory PCR is negative. Consult SLP for swallow eval. Order sputum culture. Cont broad spectrum IV antibiotics and Duonebs. Order IS.     Acute hypoxic respiratory failure: Likely 2/2 above. D-Dimer positive and V/Q ordered upon admission in the setting of MARCIN. Cont treatment as outlined above and wean supplemental O2 as tolerated.     UTI: Urine culture pending. Cont broad spectrum IV antibiotics as above.     Acute encephalopathy: Possibly metabolic in the setting of above as well as toxic. CT head negative for any acute intracranial abnormalities. TSH is normal. UDS positive for benzodiazepines, opiate, oxycodone and THC. Responded to Narcan. Appears more alert this AM per report. Cont treatment as outlined above and supportive treatment.     MARCIN: Possibly 2/2 dehydration and sepsis. No hydronephrosis on CT. Improved with IV fluids. Holding home lisinopril and HCTZ. Cont IV fluids and repeat labs in the AM.     (+) D-Dimer: V/Q initially ordered in the setting of MARCIN. Venous duplex US of bilateral lower extremities ordered.     Diarrhea: GI PCR and C diff are negative. Cont  supportive treatment.     Electrolyte abnormalities: Mg and PO4 are low. Start electrolyte replacement protocols and monitor.     Abnormal UDS: UDS positive for benzodiazepines, opiates, oxycodone and THC. Pt reports gabapentin use as well. Pt admits to illicit drug use after her PCP stopped prescribing her controlled substances but adamantly denies intentional or unintentional overdose. Pt requested pain medication on multiple occasions during my encounter. Order CIWA for close monitoring of potential withdrawal. Cont supportive treatment.     Hypothyroidism: TSH is normal. Cont levothyroxine.     DVT PPX: SQ Lovenox    Pt is at high risk 2/2 severe sepsis, pneumonia UTI, acute hypoxic resp failure, MARCIN, encephalopathy, electrolyte abnormalities and substance abuse.     Disposition: Likely home when medically stable.       Quentin Erwin DO  06/09/21  12:12 EDT

## 2021-06-09 NOTE — PAYOR COMM NOTE
"  [facility]  NPI: [id number]    Utilization Review   Contact:Whitney Almaguer MSN, APRN, NP-C  Phone: [phone]  Fax: [phone]    nathan bh/Attn: marzena Pelletier Auth Req  REF:[id number]  DX: A41.9  Stacey Diaz (46 y.o. Female)     Date of Birth Social Security Number Address Home Phone MRN    1975  [address]   [place] [address] [phone] [id number]    [employer] Marital Status          [facility]        Admission Date Admission Type Admitting Provider Attending Provider Department, Room/Bed    21 Emergency Quentin Erwin DO Troxell, Christopher A, DO [facility] CRITICAL CARE, CC08/    Discharge Date Discharge Disposition Discharge Destination                       Attending Provider: Quentin Erwin DO    Allergies: Nsaids, Latex    Isolation: None   Infection: COVID (rule out) (21)   Code Status: CPR    Ht: 152.4 cm (60\")   Wt: 81.6 kg (180 lb)    Admission Cmt: None   Principal Problem: None                Active Insurance as of 2021     Primary Coverage     Payor Plan Insurance Group Employer/Plan Group    [facility] [place] [facility] [place]      Payor Plan Address Payor Plan Phone Number Payor Plan Fax Number Effective Dates    [address] [address]   2014 - None Entered    [place] [address]       Subscriber Name Subscriber Birth Date Member ID       STACEY DIAZ 1975 [id number]                 Emergency Contacts      (Rel.) Home Phone Work Phone Mobile Phone    Rolando Diaz (Spouse) [phone] -- --    JOEFLORINA (Relative) [phone] -- [phone]               History & Physical      Atul Medellin MD at 21 1908          Hospitalist History and Physical        Patient Identification  Name: Stacey Diaz  Age/Sex: 46 y.o. female  :  1975        MRN: [id number]  Visit Number: [id number]  Admit Date: 2021   PCP: Torito De La Torre, " "APRN          Chief complaint altered mental status, unresponsiveness    History of Present Illness:  Patient is a 46 y.o. female with history of stroke, HTN, headache, arthritis, and \"kidney disease\" though most recent records in our system from 2015 show normal renal function, who was brought to the ED for evaluation of altered mental status and unresponsiveness. Per her , she woke up feeling \"out of sorts\" this morning. Later in the morning he found her unresponsive sitting in a chair, which is how EMS reportedly found her as well. She had a pulse upon arrival to the ED and EMS reported that she maintained her vitals and her airway while en route. She became hypotensive while in the ED however. She was also febrile up to 101.9 F and tachycardic. O2 sat was 79% UDS was positive for benzodiazepines, opiates/oxycodone and THC. Upon further questioning of the  by ED staff, he reported that patient is prescribed percocet and was previously on benzodiazepines but when her PCP cut her off, she started buying them off the street. Patient was 79% on NRB mask and ABG showed severe hypoxia with PO2 41.9 and O2 sat 79% on 2L NC. She was given narcan with reported improvement in responsiveness. O2 sat reportedly came up to the upper 80s on 4L NC per repeat ABG and has since improved to the mid 90s on 4L NC. She was given IV fluid bolus which seemed to help with BP though it has remained somewhat marginal since arrival and thus a central line was placed by the ED physician. Lab workup revealed creatinine elevated at 1.93, BUN 28, albumin only 2.82, TSH 0.722 with free T4 mildly low at 0.88, ammonia mildly elevated at 55, CRP significantly elevated at 25 and WBC elevated at 14.88 but with normal lactate, and elevated d-dimer of 0.64. Acetaminophen and salicylate levels were undetectable. UA showed large blood, positive nitrite and moderate leuk esterase, TNTC RBC and TNTC WBC with 4+ bacteria suggesting UTI, " though 13-20 squamous epithelial cells were present suggesting component of contamination as well. CT head and abdomen showed no acute abnormalities while CT chest showed left lingular pneumonia and patchy bilateral groundglass airspace disease that could represent chronic inflammatory or fibrotic process. Patient has been admitted to the CCU for further management. During my interview and exam in the CCU, patient is somnolent. She will open her eyes to loud voice and noxious stimuli and will say a few words from time to time but cannot wake up enough to give any reliable history. Current BP is 107/64, sat 92% on 4L NC, RR 16 and pulse 80.      Review of Systems  Review of Systems   Unable to perform ROS: Mental status change       History  Past Medical History:   Diagnosis Date   • Arthritis    • Headache    • Hypertension    • Kidney disease    • Kidney disease    • Stroke (CMS/HCC)      Past Surgical History:   Procedure Laterality Date   • APPENDECTOMY     • COLON RESECTION SMALL BOWEL     • GALLBLADDER SURGERY     • HYSTERECTOMY       No family history on file.  Social History     Tobacco Use   • Smoking status: Never Smoker   • Smokeless tobacco: Never Used   Substance Use Topics   • Alcohol use: Yes   • Drug use: Never     (Not in a hospital admission)    Allergies:  Nsaids and Latex    Objective     Vital Signs  Temp:  [99.8 °F (37.7 °C)-101.9 °F (38.8 °C)] 99.8 °F (37.7 °C)  Heart Rate:  [] 84  Resp:  [10-18] 14  BP: ()/(35-89) 86/40  Body mass index is 30.49 kg/m².    Physical Exam:  Physical Exam  Constitutional:       Comments: Somnolent, will open eyes to loud voice and noxious stimuli and will speak a few words from time to time, not waking up enough to give reliable history or to follow simple commands.    HENT:      Head: Normocephalic.      Comments: Scar on left side of face temporo-maxillary region     Nose: Nose normal.      Mouth/Throat:      Mouth: Mucous membranes are dry.       Pharynx: Oropharynx is clear.   Eyes:      Extraocular Movements: Extraocular movements intact.      Conjunctiva/sclera: Conjunctivae normal.      Pupils: Pupils are equal, round, and reactive to light.   Cardiovascular:      Rate and Rhythm: Normal rate and regular rhythm.      Pulses: Normal pulses.      Heart sounds: Normal heart sounds. No murmur heard.     Pulmonary:      Comments: Rhonchorous breath sounds bilaterally which could be as much from snoring as from true lung pathology.  Abdominal:      General: Abdomen is flat. Bowel sounds are normal. There is no distension.      Palpations: Abdomen is soft.   Musculoskeletal:         General: No swelling.      Cervical back: Normal range of motion and neck supple. No tenderness.      Right lower leg: No edema.      Left lower leg: No edema.   Lymphadenopathy:      Cervical: No cervical adenopathy.   Skin:     General: Skin is warm and dry.      Capillary Refill: Capillary refill takes less than 2 seconds.      Comments: A few scattered scabs on legs   Neurological:      General: No focal deficit present.      Comments: Somnolent but will open eyes to loud voice and noxious stimuli, will speak a few words at times but not awake enough to give reliable history. Not following simple commands. Seems to be moving all extremities at times spontaneously, however.   Psychiatric:      Comments: Unable to assess due to somnolence           Results Review:       Lab Results:  Results from last 7 days   Lab Units 06/08/21  1443   WBC 10*3/mm3 14.88*   HEMOGLOBIN g/dL 12.6   PLATELETS 10*3/mm3 125*     Results from last 7 days   Lab Units 06/08/21  1457   CRP mg/dL 25.29*     Results from last 7 days   Lab Units 06/08/21  1443   SODIUM mmol/L 135*   POTASSIUM mmol/L 4.0   CHLORIDE mmol/L 97*   CO2 mmol/L 28.8   BUN mg/dL 28*   CREATININE mg/dL 1.93*   CALCIUM mg/dL 7.9*   GLUCOSE mg/dL 196*     Results from last 7 days   Lab Units 06/08/21  1443   MAGNESIUM mg/dL 1.8     No  results found for: HGBA1C  Results from last 7 days   Lab Units 06/08/21  1443   BILIRUBIN mg/dL 0.5   ALK PHOS U/L 45   AST (SGOT) U/L 13   ALT (SGPT) U/L 23     Results from last 7 days   Lab Units 06/08/21  1443   CK TOTAL U/L 109   TROPONIN T ng/mL 0.017         Results from last 7 days   Lab Units 06/08/21  1456   INR  0.95     Results from last 7 days   Lab Units 06/08/21  1700   PH, ARTERIAL pH units 7.435   PO2 ART mm Hg 56.3*   PCO2, ARTERIAL mm Hg 42.9   HCO3 ART mmol/L 28.8*       I have reviewed the patient's laboratory results.    Imaging:  Imaging Results (Last 72 Hours)     Procedure Component Value Units Date/Time    XR Chest 1 View [[id number]] Collected: 06/08/21 1859     Updated: 06/08/21 1902    Narrative:      EXAM:    XR Chest, 1 View     EXAM DATE:    6/8/2021 6:34 PM     CLINICAL HISTORY:    POST CENTRAL LINE PLACE INTERNAL JUGULAR; J18.9-Pneumonia, unspecified  organism; N30.01-Acute cystitis with hematuria; A41.9-Sepsis,  unspecified organism; R65.21-Severe sepsis with septic shock     TECHNIQUE:    Frontal view of the chest.     COMPARISON:    06/14/2015     FINDINGS:    LUNGS:  Patchy bibasilar airspace.    PLEURAL SPACE:  Probably tiny bilateral pleural effusions.  No  pneumothorax.    HEART:  Cardiomegaly.    MEDIASTINUM:  Unremarkable.    BONES/JOINTS:  Unremarkable.    TUBES, LINES AND DEVICES:  Right central line with tip in SVC.       Impression:      1.  Cardiomegaly.  2.  Patchy bibasilar airspace.  3.  Probably tiny bilateral pleural effusions.     This report was finalized on 6/8/2021 7:00 PM by Dr. Carlos Leonardo MD.       CT Abdomen Pelvis Without Contrast [[id number]] Collected: 06/08/21 1355     Updated: 06/08/21 1358    Narrative:      EXAM:    CT Abdomen and Pelvis Without Intravenous Contrast     EXAM DATE:    6/8/2021 1:32 PM     CLINICAL HISTORY:    ams     TECHNIQUE:    Axial computed tomography images of the abdomen and pelvis without  intravenous contrast.  Sagittal and  coronal reformatted images were  created and reviewed.  This CT exam was performed using one or more of  the following dose reduction techniques:  automated exposure control,  adjustment of the mA and/or kV according to patient size, and/or use of  iterative reconstruction technique.     COMPARISON:    No relevant prior studies available.     FINDINGS:    LUNG BASES:  Lung bases previously described.      ABDOMEN:    LIVER:  Unremarkable.    GALLBLADDER AND BILE DUCTS:  Cholecystectomy clips.  No ductal  dilation.    PANCREAS:  Unremarkable.  No ductal dilation.    SPLEEN:  Unremarkable.  No splenomegaly.    ADRENALS:  Unremarkable.  No mass.    KIDNEYS AND URETERS:  Unremarkable.  No obstructing stones.  No  hydronephrosis.    STOMACH AND BOWEL:  Abundant stool throughout the colon.  No  obstruction.  No mucosal thickening.      PELVIS:    APPENDIX:  No findings to suggest acute appendicitis.    BLADDER:  Unremarkable.  No stones.    REPRODUCTIVE:  Unremarkable as visualized.      ABDOMEN and PELVIS:    INTRAPERITONEAL SPACE:  Unremarkable.  No free air.  No significant  fluid collection.    BONES/JOINTS:  No acute fracture.  No dislocation.    SOFT TISSUES:  Unremarkable.    VASCULATURE:  Unremarkable.  No abdominal aortic aneurysm.    LYMPH NODES:  Unremarkable.  No enlarged lymph nodes.       Impression:        No acute findings in the abdomen or pelvis.     This report was finalized on 6/8/2021 1:56 PM by Dr. Carlos Leonardo MD.       CT Chest Without Contrast Diagnostic [[id number]] Collected: 06/08/21 1354     Updated: 06/08/21 1357    Narrative:      EXAM:    CT Chest Without Intravenous Contrast     EXAM DATE:    6/8/2021 1:31 PM     CLINICAL HISTORY:    ams     TECHNIQUE:    Axial computed tomography images of the chest without intravenous  contrast.  Sagittal and coronal reformatted images were created and  reviewed.  This CT exam was performed using one or more of the following  dose reduction techniques:   automated exposure control, adjustment of  the mA and/or kV according to patient size, and/or use of iterative  reconstruction technique.     COMPARISON:    No relevant prior studies available.     FINDINGS:    LUNGS:  Left lingular pneumonia.  Patchy bilateral groundglass  airspace disease that is nonspecific but could represent chronic  inflammatory or fibrotic process.    PLEURAL SPACE:  Unremarkable.  No pneumothorax.  No significant  effusion.    HEART:  Unremarkable.  No cardiomegaly.  No significant pericardial  effusion.    BONES/JOINTS:  Unremarkable.  No acute fracture.  No dislocation.    SOFT TISSUES:  Unremarkable.    VASCULATURE:  Unremarkable.  No thoracic aortic aneurysm.    LYMPH NODES:  Unremarkable.  No enlarged lymph nodes.       Impression:      1.  Left lingular pneumonia.  2.  Patchy bilateral groundglass airspace disease that is nonspecific  but could represent chronic inflammatory or fibrotic process.     This report was finalized on 6/8/2021 1:55 PM by Dr. Carlos Leonardo MD.       CT Head Without Contrast [[id number]] Collected: 06/08/21 1333     Updated: 06/08/21 1346    Narrative:      EXAM:    CT Head Without Intravenous Contrast     EXAM DATE:    6/8/2021 1:30 PM     CLINICAL HISTORY:    ams     TECHNIQUE:    Axial computed tomography images of the head/brain without intravenous  contrast.  Sagittal and coronal reformatted images were created and  reviewed.  This CT exam was performed using one or more of the following  dose reduction techniques:  automated exposure control, adjustment of  the mA and/or kV according to patient size, and/or use of iterative  reconstruction technique.     COMPARISON:    No relevant prior studies available.     FINDINGS:    BRAIN:  Unremarkable.  No hemorrhage.  No significant white matter  disease.  No edema.    VENTRICLES:  Unremarkable.  No ventriculomegaly.    BONES/JOINTS:  Unremarkable.  No acute fracture.    SOFT TISSUES:  Unremarkable.    SINUSES:   Unremarkable as visualized.  No acute sinusitis.    MASTOID AIR CELLS:  Unremarkable as visualized.  No mastoid effusion.       Impression:        Unremarkable exam demonstrating no CT evidence of acute intracranial  findings.     This report was finalized on 6/8/2021 1:33 PM by Dr. Carlos Leonardo MD.             I have personally reviewed the patient's radiologic imaging.        EKG: Sinus tachycardia with occasional PVC's, , QTc 405. Biatrial enlargement. No overt ST changes to suggest acute ischemia appreciated.     I have personally reviewed the patient's EKG.        Assessment/Plan     - Severe sepsis (CMS/HCC), present on admission with fever, tachycardia, hypotension that has thus far responded to IV fluid hydration though still marginal, leukocytosis, CRP elevation and MARCIN, felt to be secondary to left lingular pneumonia and also possible UTI though presence of large number of squamous epithelial cells raises concern for component of contamination: admitted to the CCU. Treat with IV vancomycin, cefepime and flagyl for now. MRSA nasal swab pending; if negative then can discontinue vancomycin. Cefepime and flagyl should adequately cover for community acquired and potentially aspiration pneumonia given multiple sedating medications and unresponsiveness that improved with narcan suggesting high risk for aspiration, as well as UTI, while awaiting urine and blood culture results. Send respiratory PCR to rule out atypical organisms. Continue to trend WBC, CRP.   - MARCIN: hydrate with IV fluids. Review home meds once reconciled by pharmacy and hold any potentially nephrotoxic agents. Monitor strict I/O's. If BP drops despite aggressive IV fluid challenge, will initiate vasopressors to improve renal perfusion.   - Acute hypoxic respiratory failure: felt to be multifactorial, secondary to severe sepsis and pneumonia above along with potentially sedating effects of multiple medications (opiates, benzodiazepines,  THC) in her system. The fact that responsiveness and oxygenation improved with administration of narcan supports this. Continue supplemental oxygen, currently at 4L NC, titrate to keep O2 sat in low 90s.   - Acute encephalopathy: likely multifactorial from severe sepsis, pneumonia, acute hypoxic respiratory failure, and sedating effects of multiple medications present on UDS. Ammonia level was slightly elevated but significance of this is uncertain and patient has no documented history of cirrhosis. Again, improved some with narcan. No MAGGIE on file at this time to determine whether these medications are prescribed to the patient. Per 's account to ED physician, however, patient is prescribed percocet but now buys benzodiazepines off the street after being cut off by her PCP. Continue to monitor closely. Neuro checks q2h. Patient is still very somnolent on my exam in the CCU, so will give additional dose of narcan. Apprehensive about giving romazicon in setting of chronic benzodiazepine use, however, due to concerns for triggering seizures. Will leave this to discretion of AICU.   - Positive d-dimer: Suspect likely reactive in nature from severe sepsis, but VQ scan and venous doppler ordered to rule out VTE.   - Mild thrombocytopenia: monitor closely, especially while receiving lovenox for DVT prophylaxis. With mildly low platelets and mildly elevated ammonia level, will screen for viral hepatitis to look for any potential source of underlying liver dysfunction. INR is normal, however, which would argue against impaired hepatic function.     DVT Prophylaxis: SQ Lovenox    Estimated Length of Stay >2 midnights    I discussed the patient's findings, assessment and plan with nursing staff who were present during my interview and exam in the CCU.    * patient is high risk due to severe sepsis, left lingular pneumonia, possible UTI, MARCIN, acute hypoxic respiratory failure, acute encephalopathy likely  multifactorial, concern for polypharmacy, positive d-dimer    Atul Medellin MD  06/08/21  19:08 EDT      Electronically signed by Atul Medellin MD at 06/08/21 1955          Emergency Department Notes      Lucy Villanueva at 06/08/21 1302        bgl 256     Lucy Villanueva  06/08/21 1322      Electronically signed by Lucy Villanueva at 06/08/21 1322     Lucy Villanueva at 06/08/21 1305        ekg performed and shown to dr warner at bedside     Lucy Villanueva  06/08/21 1323      Electronically signed by Lucy Villanueva at 06/08/21 1323     Lucy Villanueva at 06/08/21 1408        I attempted 4 times to get pts blood and was unsuccessful. Im going to see if rt therapist will try     Lucy Villanueva  06/08/21 1408      Electronically signed by Lucy Villanueva at 06/08/21 1408     Leah Giron, RN at 06/08/21 1416        Kiki in Laboratory notified of need to stick patient for labs.     Leah Giron, RN  06/08/21 1416      Electronically signed by Leah Giron, RN at 06/08/21 1416     Lucy Villanueva at 06/08/21 1425        Kiki with respitory attempting to get labs at this time     Lucy Villanueva  06/08/21 1425      Electronically signed by Lucy Villanueva at 06/08/21 1425     Lucy Villanueva at 06/08/21 1528        I spoke with gissel from resp and he is going to come help me try to get the second culture.      Lucy Villanueva  06/08/21 1529      Electronically signed by Lucy Villanueva at 06/08/21 1529     Harjit Caldera, RN at 06/08/21 1737        Dr warner awre of blood pressure trending down. Dr warner requests CVC set up. All supplies at bedside. Family at bedside and aware of CVC placement.     Harjit Caldera, RN  06/08/21 1738      Electronically signed by Harjit Caldera, RN at 06/08/21 1738     Harjit Caldera, RN at 06/08/21 1826        RADIOLOGY NOTIFIED ABOUT XRAY FOR CVC PLACEMENT.     Harjit Caldera, GOMEZ  06/08/21 3278      Electronically  signed by Harjit Caldera, GOMEZ at 06/08/21 1826     Nayeli Morley RN at 06/08/21 1832        Central line placement confirmed by Dr. Delgadillo per port chest at this time.     Nayeli Morley RN  06/08/21 1833      Electronically signed by Nayeli Morley RN at 06/08/21 1833       Vital Signs (last 2 days)     Date/Time   Temp   Temp src   Pulse   Resp   BP   SpO2    06/09/21 0705   --   --   112   --   126/80   (!) 89    06/09/21 0630   --   --   --   --   --   92    06/09/21 0602   --   --   116   --   124/66   90    06/09/21 0502   --   --   116   21   145/55   94    06/09/21 0402   99 (37.2)   Oral   116   --   116/62   95    06/09/21 0302   --   --   117   --   135/77   94    06/09/21 0202   --   --   104   16   155/91   95    06/09/21 0119   --   --   112   20   --   94    06/09/21 0102   --   --   106   18   157/86   95    06/09/21 0002   99 (37.2)   Oral   108   20   151/95   97    06/08/21 2302   --   --   100   20   123/86   94    06/08/21 2202   --   --   97   18   150/78   97    06/08/21 2102   --   --   92   20   143/76   96    06/08/21 2001   98.9 (37.2)   Oral   84   16   119/68   95    06/08/21 19:08:28   --   --   82   16   (!) 86/40   95    06/08/21 1908   --   --   85   --   98/43   94    06/08/21 1907   --   --   82   --   --   95    06/08/21 1902   --   --   84   --   (!) 86/40   (!) 89    06/08/21 1857   --   --   84   --   (!) 86/41   91    06/08/21 1852   --   --   78   --   103/48   95    06/08/21 18:51:42   --   --   87   14   104/43   91    06/08/21 1846   --   --   87   --   104/43   95    06/08/21 1836   --   --   82   --   121/64   92    06/08/21 18:33:54   --   --   81   14   120/64   90    06/08/21 1832   --   --   81   --   120/84   90    06/08/21 1827   --   --   85   --   97/44   (!) 88    06/08/21 18:25:35   --   --   85   14   129/64   (!) 89    06/08/21 1822   --   --   84   --   129/64   (!) 86    06/08/21 1817   --   --   103   --   109/75   (!) 83    06/08/21 18:15:18    --   --   85   --   --   (!) 84    06/08/21 1812   --   --   90   --   109/68   (!) 83    06/08/21 17:39:11   --   --   85   18   (!) 85/35   91    06/08/21 1732   --   --   85   --   (!) 85/35   90    06/08/21 17:21:04   --   --   86   18   104/53   96    06/08/21 1720   --   --   82   18   --   96    06/08/21 1717   --   --   86   --   104/53   96    06/08/21 1712   --   --   93   --   (!) 84/68   94    06/08/21 1710   --   --   88   18   --   95    06/08/21 17:09:03   --   --   89   16   101/53   97    06/08/21 1708   --   --   89   --   101/53   97    06/08/21 1705   --   --   90   --   (!) 73/56   95    06/08/21 1702   --   --   89   --   (!) 78/41   93    06/08/21 1657   --   --   89   --   (!) 81/41   93    06/08/21 1652   --   --   84   --   --   96    06/08/21 16:46:01   99.8 (37.7)   Rectal   86   18   101/68   --    06/08/21 1646   --   --   86   --   101/68   95    06/08/21 1641   --   --   84   --   91/40   95    06/08/21 16:26:43   --   --   92   16   104/47   95    06/08/21 1623   --   --   90   --   104/47   93    06/08/21 15:58:08   --   --   90   16   111/70   96    06/08/21 1552   --   --   90   --   111/70   98    06/08/21 1546   --   --   86   --   95/55   96    06/08/21 1541   --   --   77   --   98/52   95    06/08/21 1536   --   --   86   --   96/54   96    06/08/21 1532   --   --   92   --   93/50   96    06/08/21 1527   --   --   92   --   97/52   95    06/08/21 1522   --   --   91   --   93/53   97    06/08/21 15:17:46   --   --   92   14   90/56   96    06/08/21 1517   --   --   93   --   90/56   100    06/08/21 1512   --   --   88   --   104/57   96    06/08/21 1507   --   --   88   --   116/60   97    06/08/21 1502   --   --   90   --   112/50   96    06/08/21 1451   --   --   87   --   (!) 87/42   100    06/08/21 1446   --   --   87   --   94/50   98    06/08/21 14:33:39   --   --   89   16   108/63   94    06/08/21 1427   --   --   86   --   108/63   91    06/08/21 14:08:32   --   --    92   16   (!) 89/45   97    06/08/21 1407   --   --   94   --   (!) 80/41   93    06/08/21 1357   --   --   97   --   92/50   96    06/08/21 1354   --   --   --   --   --   92    06/08/21 1351   --   --   103   --   100/54   (!) 89    06/08/21 13:47:05   --   --   107   18   101/48   98    06/08/21 1346   --   --   103   --   101/48   93    06/08/21 1315   --   --   120   --   95/58   100    06/08/21 1312   (!) 101.9 (38.8)   Rectal   118   10   103/89   (!) 79            Lab Results (last 48 hours)     Procedure Component Value Units Date/Time    Comprehensive Metabolic Panel [[id number]]  (Abnormal) Collected: 06/09/21 0023    Specimen: Blood Updated: 06/09/21 0122     Glucose 131 mg/dL      BUN 18 mg/dL      Creatinine 0.81 mg/dL      Sodium 137 mmol/L      Potassium 3.8 mmol/L      Chloride 100 mmol/L      CO2 28.2 mmol/L      Calcium 7.7 mg/dL      Total Protein 5.4 g/dL      Albumin 3.01 g/dL      ALT (SGPT) 22 U/L      AST (SGOT) 12 U/L      Alkaline Phosphatase 47 U/L      Total Bilirubin 0.3 mg/dL      eGFR Non African Amer 76 mL/min/1.73      Globulin 2.4 gm/dL      A/G Ratio 1.3 g/dL      BUN/Creatinine Ratio 22.2     Anion Gap 8.8 mmol/L     Narrative:      GFR Normal >60  Chronic Kidney Disease <60  Kidney Failure <15      CK [[id number]]  (Normal) Collected: 06/09/21 0023    Specimen: Blood Updated: 06/09/21 0116     Creatine Kinase 77 U/L     C-reactive Protein [[id number]]  (Abnormal) Collected: 06/09/21 0023    Specimen: Blood Updated: 06/09/21 0116     C-Reactive Protein 34.38 mg/dL     Magnesium [[id number]]  (Normal) Collected: 06/09/21 0023    Specimen: Blood Updated: 06/09/21 0116     Magnesium 1.6 mg/dL     Phosphorus [[id number]]  (Abnormal) Collected: 06/09/21 0023    Specimen: Blood Updated: 06/09/21 0116     Phosphorus 2.2 mg/dL     Troponin [[id number]]  (Normal) Collected: 06/09/21 0023    Specimen: Blood Updated: 06/09/21 0114     Troponin T <0.010 ng/mL     Narrative:      Troponin T  Reference Range:  <= 0.03 ng/mL-   Negative for AMI  >0.03 ng/mL-     Abnormal for myocardial necrosis.  Clinicians would have to utilize clinical acumen, EKG, Troponin and serial changes to determine if it is an Acute Myocardial Infarction or myocardial injury due to an underlying chronic condition.       Results may be falsely decreased if patient taking Biotin.      CBC Auto Differential [[id number]]  (Abnormal) Collected: 06/09/21 0023    Specimen: Blood Updated: 06/09/21 0053     WBC 13.44 10*3/mm3      RBC 4.30 10*6/mm3      Hemoglobin 12.0 g/dL      Hematocrit 37.1 %      MCV 86.3 fL      MCH 27.9 pg      MCHC 32.3 g/dL      RDW 16.4 %      RDW-SD 50.8 fl      MPV 8.9 fL      Platelets 135 10*3/mm3      Neutrophil % 81.6 %      Lymphocyte % 14.7 %      Monocyte % 2.8 %      Eosinophil % 0.4 %      Basophil % 0.1 %      Immature Grans % 0.4 %      Neutrophils, Absolute 10.97 10*3/mm3      Lymphocytes, Absolute 1.97 10*3/mm3      Monocytes, Absolute 0.37 10*3/mm3      Eosinophils, Absolute 0.06 10*3/mm3      Basophils, Absolute 0.01 10*3/mm3      Immature Grans, Absolute 0.06 10*3/mm3      nRBC 0.0 /100 WBC     MRSA Screen, PCR (Inpatient) - Swab, Nares [[id number]]  (Normal) Collected: 06/08/21 2044    Specimen: Swab from Nares Updated: 06/08/21 2159     MRSA PCR Negative     Staph aureus by PCR Negative    Respiratory Panel, PCR (WITHOUT COVID) - Swab, Nasopharynx [[id number]]  (Normal) Collected: 06/08/21 2044    Specimen: Swab from Nasopharynx Updated: 06/08/21 2141     ADENOVIRUS, PCR Not Detected     Coronavirus 229E Not Detected     Coronavirus HKU1 Not Detected     Coronavirus NL63 Not Detected     Coronavirus OC43 Not Detected     Human Metapneumovirus Not Detected     Human Rhinovirus/Enterovirus Not Detected     Influenza B PCR Not Detected     Parainfluenza Virus 1 Not Detected     Parainfluenza Virus 2 Not Detected     Parainfluenza Virus 3 Not Detected     Parainfluenza Virus 4 Not Detected      Bordetella pertussis pcr Not Detected     Chlamydophila pneumoniae PCR Not Detected     Mycoplasma pneumo by PCR Not Detected     Influenza A PCR Not Detected     RSV, PCR Not Detected     Bordetella parapertussis PCR Not Detected    Narrative:      The coronavirus on the RVP is NOT COVID-19 and is NOT indicative of infection with COVID-19.    In the setting of a positive respiratory panel with a viral infection PLUS a negative procalcitonin without other underlying concern for bacterial infection, consider observing off antibiotics or discontinuation of antibiotics and continue supportive care. If the respiratory panel is positive for atypical bacterial infection (Bordetella pertussis, Chlamydophila pneumoniae, or Mycoplasma pneumoniae), consider antibiotic de-escalation to target atypical bacterial infection.    Hepatitis Panel, Acute [[id number]]  (Normal) Collected: 06/08/21 2017    Specimen: Blood Updated: 06/08/21 2057     Hepatitis B Surface Ag Non-Reactive     Hep A IgM Non-Reactive     Hep B C IgM Non-Reactive     Hepatitis C Ab Non-Reactive    Narrative:      Results may be falsely decreased if patient taking Biotin.     Troponin [[id number]]  (Normal) Collected: 06/08/21 2017    Specimen: Blood Updated: 06/08/21 2044     Troponin T <0.010 ng/mL     Narrative:      Troponin T Reference Range:  <= 0.03 ng/mL-   Negative for AMI  >0.03 ng/mL-     Abnormal for myocardial necrosis.  Clinicians would have to utilize clinical acumen, EKG, Troponin and serial changes to determine if it is an Acute Myocardial Infarction or myocardial injury due to an underlying chronic condition.       Results may be falsely decreased if patient taking Biotin.      BNP [[id number]]  (Abnormal) Collected: 06/08/21 1443    Specimen: Blood Updated: 06/08/21 1952     proBNP 1,163.0 pg/mL     Narrative:      Among patients with dyspnea, NT-proBNP is highly sensitive for the detection of acute congestive heart failure. In addition  NT-proBNP of <300 pg/ml effectively rules out acute congestive heart failure with 99% negative predictive value.    Results may be falsely decreased if patient taking Biotin.      Urine Culture - Urine, Urine, Catheter In/Out [[id number]] Collected: 06/08/21 1350    Specimen: Urine, Catheter In/Out Updated: 06/08/21 1746    Blood Gas, Arterial With Co-Ox [[id number]]  (Abnormal) Collected: 06/08/21 1700    Specimen: Arterial Blood Updated: 06/08/21 1703     Site Left Brachial     Darwin's Test N/A     pH, Arterial 7.435 pH units      pCO2, Arterial 42.9 mm Hg      pO2, Arterial 56.3 mm Hg      Comment: 84 Value below reference range        HCO3, Arterial 28.8 mmol/L      Comment: 83 Value above reference range        Base Excess, Arterial 4.1 mmol/L      O2 Saturation, Arterial 87.8 %      Comment: 84 Value below reference range        Hemoglobin, Blood Gas 11.9 g/dL      Comment: 84 Value below reference range        Hematocrit, Blood Gas 36.4 %      Comment: 84 Value below reference range        Oxyhemoglobin 86.7 %      Comment: 84 Value below reference range        Methemoglobin 0.70 %      Carboxyhemoglobin 0.5 %      A-a Gradiant 141.9 mmHg      CO2 Content 30.1 mmol/L      Temperature 0.0 C      Barometric Pressure for Blood Gas 729 mmHg      Modality Nasal Cannula     FIO2 36 %      Flow Rate 4.0 lpm      Ventilator Mode NA     Note Read back and acknowledge     Notified Who DR BADILLO // RN     Notified By MAY     Collected by [id number]     Comment: Meter: [id number]     :  [id number]        pH, Temp Corrected --     pCO2, Temperature Corrected --     pO2, Temperature Corrected --    Blood Culture - Blood, Arm, Left [[id number]] Collected: 06/08/21 1607    Specimen: Blood from Arm, Left Updated: 06/08/21 1611    [place] [facility] [[id number]] Collected: 06/08/21 1443    Specimen: Blood Updated: 06/08/21 1602    Narrative:      The following orders were created for panel order [place] [facility].  Procedure                                Abnormality         Status                     ---------                               -----------         ------                     Light Blue Top[[id number]]                                   Final result               Green Top (Gel)[[id number]]                                  Final result               Lavender Top[[id number]]                                     Final result               Gold Top - SST[[id number]]                                   Final result                 Please view results for these tests on the individual orders.    Light Blue Top [[id number]] Collected: 06/08/21 1456    Specimen: Blood from Arm, Right Updated: 06/08/21 1602     Extra Tube hold for add-on     Comment: Auto resulted       Gold Top - SST [[id number]] Collected: 06/08/21 1457    Specimen: Blood from Arm, Right Updated: 06/08/21 1602     Extra Tube Hold for add-ons.     Comment: Auto resulted.       Green Top (Gel) [[id number]] Collected: 06/08/21 1443    Specimen: Blood Updated: 06/08/21 1545     Extra Tube Hold for add-ons.     Comment: Auto resulted.       Lavender Top [[id number]] Collected: 06/08/21 1443    Specimen: Blood Updated: 06/08/21 1545     Extra Tube hold for add-on     Comment: Auto resulted       Acetaminophen Level [[id number]]  (Normal) Collected: 06/08/21 1457    Specimen: Blood from Arm, Right Updated: 06/08/21 1531     Acetaminophen <5.0 mcg/mL     C-reactive Protein [[id number]]  (Abnormal) Collected: 06/08/21 1457    Specimen: Blood from Arm, Right Updated: 06/08/21 1531     C-Reactive Protein 25.29 mg/dL     Lactic Acid, Plasma [[id number]]  (Normal) Collected: 06/08/21 1457    Specimen: Blood from Arm, Right Updated: 06/08/21 1530     Lactate 0.8 mmol/L     aPTT [[id number]]  (Normal) Collected: 06/08/21 1456    Specimen: Blood from Arm, Right Updated: 06/08/21 1528     PTT 28.9 seconds      Comment: Note new Reference Range       Narrative:      PTT Heparin Therapeutic Range:  59 -  95 seconds      Protime-INR [[id number]]  (Normal) Collected: 06/08/21 1456    Specimen: Blood from Arm, Right Updated: 06/08/21 1527     Protime 13.1 Seconds      Comment: Note new Reference Range        INR 0.95    Narrative:      Suggested INR therapeutic range for stable oral anticoagulant therapy:    Low Intensity therapy:   1.5-2.0  Moderate Intensity therapy:   2.0-3.0  High Intensity therapy:   2.5-4.0    D-dimer, Quantitative [[id number]]  (Abnormal) Collected: 06/08/21 1457    Specimen: Blood from Arm, Right Updated: 06/08/21 1526     D-Dimer, Quantitative 0.64 MCGFEU/mL     Narrative:      d-Dimer assay result is to be used in conjunction with a non-high clinical pretest probability (PTP) assessment tool to exclude PE and aid in diagnosis of VTE with cutoff of 0.5 MCGFEU/mL.    COVID-19 and FLU A/B PCR - Swab, Nasopharynx [[id number]]  (Normal) Collected: 06/08/21 1458    Specimen: Swab from Nasopharynx Updated: 06/08/21 1525     COVID19 Not Detected     Influenza A PCR Not Detected     Influenza B PCR Not Detected    Narrative:      Fact sheet for providers: https://www.fda.gov/media/319558/download    Fact sheet for patients: https://www.fda.gov/media/413362/download    Test performed by PCR.    Urine Drug Screen - Urine, Clean Catch [[id number]]  (Abnormal) Collected: 06/08/21 1457    Specimen: Urine, Clean Catch Updated: 06/08/21 1521     Amphetamine Screen, Urine Negative     Barbiturates Screen, Urine Negative     Benzodiazepine Screen, Urine Positive     Cocaine Screen, Urine Negative     Methadone Screen, Urine Negative     Opiate Screen Positive     Phencyclidine (PCP), Urine Negative     THC, Screen, Urine Positive     6-ACETYL MORPHINE Negative     Buprenorphine, Screen, Urine Negative     Oxycodone Screen, Urine Positive    Narrative:      Negative Thresholds For Drugs Screened:                  Amphetamines              1000 ng/ml               Barbiturates               200 ng/ml                Benzodiazepines            200 ng/ml              Cocaine                    300 ng/ml              Methadone                  300 ng/ml              Opiates                    300 ng/ml               Phencyclidine               25 ng/ml               THC                         50 ng/ml              6-Acetyl Morphine           10 ng/ml              Buprenorphine                5 ng/ml              Oxycodone                  300 ng/ml    The reference range for all drugs tested is negative. This report includes final unconfirmed qualitative results to be used for medical treatment purposes only. Unconfirmed results must not be used for non-medical purposes such as employment or legal testing. Clinical consideration should be applied to any drug of abuse test, especially when unconfirmed quantitative results are used.        Comprehensive Metabolic Panel [[id number]]  (Abnormal) Collected: 06/08/21 1443    Specimen: Blood Updated: 06/08/21 1520     Glucose 196 mg/dL      BUN 28 mg/dL      Creatinine 1.93 mg/dL      Sodium 135 mmol/L      Potassium 4.0 mmol/L      Comment: Slight hemolysis detected by analyzer. Results may be affected.        Chloride 97 mmol/L      CO2 28.8 mmol/L      Calcium 7.9 mg/dL      Total Protein 5.3 g/dL      Albumin 2.82 g/dL      ALT (SGPT) 23 U/L      AST (SGOT) 13 U/L      Alkaline Phosphatase 45 U/L      Total Bilirubin 0.5 mg/dL      eGFR Non African Amer 28 mL/min/1.73      Globulin 2.5 gm/dL      A/G Ratio 1.1 g/dL      BUN/Creatinine Ratio 14.5     Anion Gap 9.2 mmol/L     Narrative:      GFR Normal >60  Chronic Kidney Disease <60  Kidney Failure <15      Troponin [[id number]]  (Normal) Collected: 06/08/21 1443    Specimen: Blood Updated: 06/08/21 1520     Troponin T 0.017 ng/mL     Narrative:      Troponin T Reference Range:  <= 0.03 ng/mL-   Negative for AMI  >0.03 ng/mL-     Abnormal for myocardial necrosis.  Clinicians would have to utilize clinical acumen, EKG, Troponin and  serial changes to determine if it is an Acute Myocardial Infarction or myocardial injury due to an underlying chronic condition.       Results may be falsely decreased if patient taking Biotin.      TSH [[id number]]  (Normal) Collected: 06/08/21 1443    Specimen: Blood Updated: 06/08/21 1520     TSH 0.722 uIU/mL     T4, Free [[id number]]  (Abnormal) Collected: 06/08/21 1443    Specimen: Blood Updated: 06/08/21 1520     Free T4 0.88 ng/dL     Narrative:      Results may be falsely increased if patient taking Biotin.      Ethanol [[id number]] Collected: 06/08/21 1443    Specimen: Blood Updated: 06/08/21 1518     Ethanol <10 mg/dL      Ethanol % <0.010 %     Narrative:      >/= 80.0 legally intoxicated    Salicylate Level [[id number]]  (Normal) Collected: 06/08/21 1443    Specimen: Blood Updated: 06/08/21 1518     Salicylate <0.3 mg/dL     CK [[id number]]  (Normal) Collected: 06/08/21 1443    Specimen: Blood Updated: 06/08/21 1518     Creatine Kinase 109 U/L     Magnesium [[id number]]  (Normal) Collected: 06/08/21 1443    Specimen: Blood Updated: 06/08/21 1518     Magnesium 1.8 mg/dL     Phosphorus [[id number]]  (Normal) Collected: 06/08/21 1443    Specimen: Blood Updated: 06/08/21 1518     Phosphorus 2.6 mg/dL     Ammonia [[id number]]  (Abnormal) Collected: 06/08/21 1443    Specimen: Blood Updated: 06/08/21 1515     Ammonia 55 umol/L      Comment: Specimen hemolyzed.  Results may be affected.       Sedimentation Rate [[id number]]  (Normal) Collected: 06/08/21 1443    Specimen: Blood Updated: 06/08/21 1506     Sed Rate 12 mm/hr     Blood Culture - Blood, Arm, Left [[id number]] Collected: 06/08/21 1430    Specimen: Blood from Arm, Left Updated: 06/08/21 1502    CBC & Differential [[id number]]  (Abnormal) Collected: 06/08/21 1443    Specimen: Blood Updated: 06/08/21 1448    Narrative:      The following orders were created for panel order CBC & Differential.  Procedure                               Abnormality         Status                      ---------                               -----------         ------                     CBC Auto Differential[[id number]]        Abnormal            Final result                 Please view results for these tests on the individual orders.    CBC Auto Differential [[id number]]  (Abnormal) Collected: 06/08/21 1443    Specimen: Blood Updated: 06/08/21 1448     WBC 14.88 10*3/mm3      RBC 4.53 10*6/mm3      Hemoglobin 12.6 g/dL      Hematocrit 40.3 %      MCV 89.0 fL      MCH 27.8 pg      MCHC 31.3 g/dL      RDW 16.3 %      RDW-SD 53.8 fl      MPV 9.5 fL      Platelets 125 10*3/mm3      Neutrophil % 71.6 %      Lymphocyte % 22.9 %      Monocyte % 4.4 %      Eosinophil % 0.2 %      Basophil % 0.1 %      Immature Grans % 0.8 %      Neutrophils, Absolute 10.64 10*3/mm3      Lymphocytes, Absolute 3.41 10*3/mm3      Monocytes, Absolute 0.66 10*3/mm3      Eosinophils, Absolute 0.03 10*3/mm3      Basophils, Absolute 0.02 10*3/mm3      Immature Grans, Absolute 0.12 10*3/mm3      nRBC 0.1 /100 WBC     Urinalysis, Microscopic Only - Urine, Catheter In/Out [[id number]]  (Abnormal) Collected: 06/08/21 1350    Specimen: Urine, Catheter In/Out Updated: 06/08/21 1419     RBC, UA Too Numerous to Count /HPF      WBC, UA Too Numerous to Count /HPF      Bacteria, UA 4+ /HPF      Squamous Epithelial Cells, UA 13-20 /HPF      Hyaline Casts, UA None Seen /LPF      Methodology Manual Light Microscopy    Urinalysis With Microscopic If Indicated (No Culture) - Urine, Catheter In/Out [[id number]]  (Abnormal) Collected: 06/08/21 1350    Specimen: Urine, Catheter In/Out Updated: 06/08/21 1405     Color, UA Dark Yellow     Appearance, UA Turbid     pH, UA 5.5     Specific Gravity, UA >=1.030     Glucose,  mg/dL (2+)     Ketones, UA Trace     Bilirubin, UA Small (1+)     Blood, UA Large (3+)     Protein,  mg/dL (2+)     Leuk Esterase, UA Moderate (2+)     Nitrite, UA Positive     Urobilinogen, UA 1.0 E.U./dL    Blood Gas,  Arterial With Co-Ox [[id number]]  (Abnormal) Collected: 06/08/21 1355    Specimen: Arterial Blood Updated: 06/08/21 1355     Site Left Brachial     Darwin's Test N/A     pH, Arterial 7.518 pH units      Comment: 83 Value above reference range        pCO2, Arterial 41.6 mm Hg      pO2, Arterial 41.9 mm Hg      Comment: 85 Value below critical limit        HCO3, Arterial 33.7 mmol/L      Comment: 83 Value above reference range        Base Excess, Arterial 9.8 mmol/L      O2 Saturation, Arterial 79.6 %      Comment: 84 Value below reference range        Hemoglobin, Blood Gas 14.3 g/dL      Hematocrit, Blood Gas 43.8 %      Oxyhemoglobin 78.6 %      Comment: 84 Value below reference range        Methemoglobin 0.50 %      Carboxyhemoglobin 0.7 %      A-a Gradiant 102.8 mmHg      CO2 Content 35.0 mmol/L      Temperature 0.0 C      Barometric Pressure for Blood Gas 730 mmHg      Modality Nasal Cannula     FIO2 28 %      Flow Rate 2.0 lpm      Ventilator Mode NA     Note --     Notified Who DR BADILLO AND CEFERINO RN, ER     Notified By [id number]     Notified Time 06/08/2021 13:55     Collected by [id number]     Comment: Meter: [id number]     :  [id number]        pH, Temp Corrected --     pCO2, Temperature Corrected --     pO2, Temperature Corrected --             Physician Progress Notes (last 48 hours) (Notes from 06/07/21 0755 through 06/09/21 0755)      Progress Notes signed by Ronnie Segura MD at 06/09/21 0052         [facility] Physician - Brief Progress Note  PERMANENT  06/08/2021 23:29    [facility] Main  Main - CCU - 10 - [place], [place] ([facility])    DICK DIAZ    Date of Service 06/08/2021 23:29    HPI/Events of Note [facility] Provider Assessment Note    46/F found unresponsive and noted to be hypotensiv, febrile, hypoxicand tachycardic in ER. UDS was positive for benzodiazepine, opiates/oxycodone and THC and was given Narcan. Admitted for management of Sepsis d/t UTI/Pneumonia  Na  135, K: 4.0, BUN 28, Creat 1.93, Lactic 0.8  CT Head and Abdomen: No acute abnormalities  CT Chest: LEft lingular pneumonia and patchy bilateral ground glass airspace disase ? Chronic inflammatory or fibrotic process.    PMH: HTN, Arthritis, Headache, Stroke    Assessment and Plan:    Sepsis d/t UTI/Pneumonia  - IVF  - Cultures, Trend lactates  - IV antibiotics (Vancomycin/Cefepime)  - Supplemental oxygen to be titrated to keep SpO2 > 93%  - Follow renal functions/electrolytes    __X___   Video Assessment performed  __X___   Most recent labs reviewed  __X___   Vital Signs reviewed  __X___   Best Practices addressed:                 VTE prophylaxis: Lovenox                 SUP (when indicated):                 Glycemic control:                      Please notify bedside physician when present or [facility] if glc > 180 X 2                 Sepsis guidelines: Y                 Lung protective strategy NA                 Targeted Temperature Management: NA    __X___     Spoke with bedside RN  _____     Orders written      [facility] for any needs if bedside physician is not present.      Interventions Major-Other: Initial assessment        Electronically Signed by: Ronnie Segura) on 06/09/2021 00:52    Electronically signed by Ronnie Segura MD at 06/09/21 0052       In Progress (Critical Met): Reviewed on 6/9/2021 by Delmi Almaguer, MSN       Created Using Review Status Review Entered   [employer]  In Primary 6/9/2021 07:53       Criteria Set Name - Subset   LOC:Acute Adult-Infection: Sepsis      Criteria Review   REVIEW SUMMARY     Patient: DICK DIAZ  Review Number: [id number]  Review Status: In Primary     Condition Specific: Yes        OUTCOMES  Outcome Type: Primary           REVIEW DETAILS     Product: LOC:Acute Adult  Subset: Infection: Sepsis      (Symptom or finding within 24h)         (Excludes PO medications unless noted)          [X] Select  Day, One:              [X] Episode Day 1, One:                  [X] CRITICAL, One:                      [X] Sepsis, actual or suspected, All:                          [X] Finding, >= Two:                              [X] Temperature, One:                                  [X] > 99.4°F(37.4°C) PO or > 100.4°F(38.0°C) WA                              [X] WBC, >= One:                                  [X] > 12,000/cu.mm(12x10 exp 9/L) or < 4,000/cu.mm(4x10 exp 9/L)                              [X] Heart rate > 90/min, sustained                          [X] Organ dysfunction, >= One:                              [X] Arterial Po2 < 56 mmHg(7.4 kPa)                          [X] Intervention, >= One:                              [X] IV fluid resuscitation <= 2d     Version: InterQual® 2020  InterQual® and InterQual®  © 2020 [employer] and/or one of its subsidiaries.  All Rights Reserved.  CPT only © 2019 [employer].  All Rights Reserved.     Scheduled Meds Sorted by Name  for Stacey Diaz as of 6/7/21 through 6/9/21    1 Day 3 Days 7 Days 10 Days < Today >    Legend:                          Inactive     Active     Other Encounter     Linked                 Medications 06/07/21 06/08/21 06/09/21   acetaminophen (TYLENOL) suppository 650 mg   Dose: 650 mg  Freq: Once Route: RE  Start: 06/08/21 1325 End: 06/08/21 1346    Admin Instructions:   Do not exceed 4 grams of acetaminophen in a 24 hr period. Max dose of 2gm for AST/ALT greater than 120 units/L      If given for pain, use the following pain scale:   Mild Pain = Pain Score of 1-3, CPOT 1-2  Moderate Pain = Pain Score of 4-6, CPOT 3-4  Severe Pain = Pain Score of 7-10, CPOT 5-8     1346             cefepime (MAXIPIME) 1 g/100 mL 0.9% NS IVPB (mbp)   Dose: 1 g  Freq: Every 12 Hours Route: IV  Indications of Use: PNEUMONIA,SEPSIS,URINARY TRACT INFECTION  Start: 06/09/21 0600 End: 06/16/21 0559    Admin Instructions:   Break  seal and mix to activate vial before use      0610   1800          cefepime (MAXIPIME) 2 g/100 mL 0.9% NS (mbp)   Dose: 2 g  Freq: Once Route: IV  Last Dose: 2 g (06/08/21 2156)  Start: 06/08/21 2115 End: 06/08/21 2226    Admin Instructions:   Break seal and mix to activate vial before use     2156             cefTRIAXone (ROCEPHIN) 2 g/100 mL 0.9% NS IVPB (MBP)   Dose: 2 g  Freq: Once Route: IV  Indications of Use: CENTRAL NERVOUS SYSTEM INFECTION,SEPSIS  Last Dose: Stopped (06/08/21 1559)  Start: 06/08/21 1330 End: 06/08/21 1559    Admin Instructions:   Caution: Look alike/sound alike drug alert. Break seal and mix to activiate vial before use.     0825 [id number]           enoxaparin (LOVENOX) syringe 40 mg   Dose: 40 mg  Freq: Every 24 Hours Route: SC  Indications of Use: PROPHYLAXIS OF VENOUS THROMBOEMBOLISM  Start: 06/09/21 0100    Admin Instructions:   Give subcutaneous in abdomen only. Do not massage site after injection.      0019            estrogens (conjugated) (PREMARIN) tablet 0.6 mg   Dose: 0.6 mg  Freq: Daily Route: PO  Start: 06/09/21 0900      0900            ipratropium-albuterol (DUO-NEB) nebulizer solution 3 mL   Dose: 3 mL  Freq: 4 Times Daily - RT Route: NEBULIZATION  Start: 06/09/21 0100      (0119) [C]   [phone]     1900            ipratropium-albuterol (DUO-NEB) nebulizer solution 3 mL   Dose: 3 mL  Freq: Once Route: NEBULIZATION  Start: 06/08/21 1709 End: 06/08/21 1710     1710             levothyroxine (SYNTHROID, LEVOTHROID) tablet 25 mcg   Dose: 25 mcg  Freq: Daily Route: PO  Start: 06/09/21 0900    Admin Instructions:   Take on empty stomach.      0900            Magnesium Sulfate 4 gram infusion- Mg 1.6-1.9 mg/dL   Dose: 4 g  Freq: Once Route: IV  Start: 06/09/21 0815    Admin Instructions:   Per protocol for Mg = 1.6  Mg 1.6-1.9 mg/dL. Recheck Mg level in the AM.      0815            metroNIDAZOLE (FLAGYL) 500 mg/100mL IVPB   Dose: 500 mg  Freq: Every 8 Hours Route: IV  Indications  of Use: PNEUMONIA,SEPSIS  Start: 06/08/21 2100 End: 06/15/21 2059    Admin Instructions:   Caution: Look alike/sound alike drug alert. Do not refrigerate.     2156 0610   1300   2100        naloxone (NARCAN) injection 0.4 mg   Dose: 0.4 mg  Freq: Once Route: IV  Start: 06/08/21 1947 End: 06/08/21 1959 1959             Naloxone HCl (NARCAN) injection 2 mg   Dose: 2 mg  Freq: Once Route: IV  Start: 06/08/21 1323 End: 06/08/21 1317     1317             pantoprazole (PROTONIX) EC tablet 40 mg   Dose: 40 mg  Freq: Daily Route: PO  Start: 06/09/21 0900    Admin Instructions:   Swallow whole; do not crush, split, or chew.      0900            piperacillin-tazobactam (ZOSYN) 3.375 g/100 mL 0.9% NS IVPB (mbp)   Dose: 3.375 g  Freq: Once Route: IV  Indications of Use: PNEUMONIA  Last Dose: Stopped (06/08/21 1627)  Start: 06/08/21 1454 End: 06/08/21 1627    Admin Instructions:   Break seal and mix to activate vial before use     3834   1627           potassium phosphate 15 mmol in sodium chloride 0.9 % 100 mL infusion   Dose: 15 mmol  Freq: Once Route: IV  Start: 06/09/21 0815    Admin Instructions:   Per protocol for Phosphorus = 2.2 (and K+ = 3.8)  Recheck Phosphorus level 6 hours after replacement complete.  Refrigerate.     Doses listed as mmol of phosphate.   4.4 mEq of Potassium = 3 mmol of Phosphate      0815            sodium chloride 0.9 % bolus 2,000 mL   Dose: 2,000 mL  Freq: Once Route: IV  Last Dose: Stopped (06/08/21 1434)  Start: 06/08/21 1323 End: 06/08/21 1434     1348   1434           sodium chloride 0.9 % flush 10 mL   Dose: 10 mL  Freq: Every 12 Hours Scheduled Route: IV  Start: 06/09/21 0030    Admin Instructions:   Lumen #3      0019   0900   2100        sodium chloride 0.9 % flush 10 mL   Dose: 10 mL  Freq: Every 12 Hours Scheduled Route: IV  Start: 06/09/21 0030    Admin Instructions:   Lumen #2       0900   2100        sodium chloride 0.9 % flush 10 mL   Dose: 10 mL  Freq: Every 12 Hours  Scheduled Route: IV  Start: 06/09/21 0030    Admin Instructions:   Lumen #1       0900   2100        sodium chloride 0.9 % flush 10 mL   Dose: 10 mL  Freq: Every 12 Hours Scheduled Route: IV  Start: 06/09/21 0030      0020   0900   2100        sodium chloride 0.9 % flush 10 mL   Dose: 10 mL  Freq: Every 12 Hours Scheduled Route: IV  Start: 06/08/21 2345       0900   2100        vancomycin 1 g/250 mL 0.9% NS (vial-mate)   Dose: 1,000 mg  Freq: Every 24 Hours Route: IV  Indications of Use: PNEUMONIA,SEPSIS,URINARY TRACT INFECTION  Start: 06/09/21 1700 End: 06/16/21 1659      1700            vancomycin 1750 mg/500 mL 0.9% NS IVPB ([facility])   Dose: 20 mg/kg  Weight Dosing Info: 83 kg  Freq: Once Route: IV  Indications of Use: CENTRAL NERVOUS SYSTEM INFECTION,SEPSIS  Last Dose: Stopped (06/08/21 1721)  Start: 06/08/21 1400 End: 06/08/21 1721     1512   1721           Medications 06/07/21 06/08/21 06/09/21       Continuous Meds Sorted by Name  for Stacey Diaz as of 6/7/21 through 6/9/21   Legend:                          Inactive     Active     Other Encounter     Linked                 Medications 06/07/21 06/08/21 06/09/21   Pharmacy Consult - Pharmacy to dose   Freq: Continuous PRN Route: XX  PRN Reason: Consult  Start: 06/08/21 1920    Order specific questions:   Pharmacy to Dose: Cefepime  Indication of Use Sepsis, PNA, UTI         Pharmacy Consult - Pharmacy to dose   Freq: Continuous PRN Route: XX  PRN Reason: Consult  Start: 06/08/21 1920    Order specific questions:   Pharmacy to Dose: Vanc  Indication of Use Sepsis, PNA, UTI         sodium chloride 0.9 % infusion   Rate: 125 mL/hr Dose: 125 mL/hr  Freq: Continuous Route: IV  Last Dose: 125 mL/hr (06/09/21 0611)  Start: 06/08/21 1449     1515   2130        0611                PRN Meds Sorted by Name  for Stacey Diaz G as of 6/7/21 through 6/9/21   Legend:                          Inactive     Active     Other Encounter     Linked                 Medications  06/07/21 06/08/21 06/09/21   acetaminophen (TYLENOL) tablet 650 mg   Dose: 650 mg  Freq: Every 4 Hours PRN Route: PO  PRN Reason: Fever  PRN Comment: fever greater than 101.5 F or headache  Start: 06/08/21 2247    Admin Instructions:   Do not exceed 4 grams of acetaminophen in a 24 hr period. Max dose of 2gm for AST/ALT greater than 120 units/L      If given for pain, use the following pain scale:   Mild Pain = Pain Score of 1-3, CPOT 1-2  Moderate Pain = Pain Score of 4-6, CPOT 3-4  Severe Pain = Pain Score of 7-10, CPOT 5-8         Or  acetaminophen (TYLENOL) suppository 650 mg   Dose: 650 mg  Freq: Every 4 Hours PRN Route: RE  PRN Reason: Fever  PRN Comment: temperature greater than 101.5 F or headache  Start: 06/08/21 2247    Admin Instructions:   Do not exceed 4 grams of acetaminophen in a 24 hr period. Max dose of 2gm for AST/ALT greater than 120 units/L      If given for pain, use the following pain scale:   Mild Pain = Pain Score of 1-3, CPOT 1-2  Moderate Pain = Pain Score of 4-6, CPOT 3-4  Severe Pain = Pain Score of 7-10, CPOT 5-8         Magnesium Sulfate 2 gram Bolus, followed by 8 gram infusion (total Mg dose 10 grams)- Mg less than or equal to 1mg/dL   Dose: 2 g  Freq: As Needed Route: IV  PRN Comment: See Administration Instructions  Start: 06/09/21 0721    Admin Instructions:   Mg less than or equal to 1mg/dL. Give 2 gm over 30 minutes as bolus, then infuse 2 gm over 2 hours for 4 doses (8 grams) for total dose of 10 grams.  Recheck Mg levels in the AM.         Or  Magnesium Sulfate 2 gram / 50mL Infusion (GIVE X 3 BAGS TO EQUAL 6GM TOTAL DOSE) - Mg 1.1 - 1.5 mg/dl   Dose: 2 g  Freq: As Needed Route: IV  PRN Comment: See Administration Instructions  Start: 06/09/21 0721    Admin Instructions:   Mg 1.1 -1.5 mg/dL. Infuse 2 grams over 2 hours for 3 doses (for a total Mg dose of 6 grams).  Recheck Mg level in the AM.         Or  Magnesium Sulfate 4 gram infusion- Mg 1.6-1.9 mg/dL   Dose: 4 g  Freq: As  Needed Route: IV  PRN Comment: See Administration Instructions  Start: 06/09/21 0721    Admin Instructions:   Mg 1.6-1.9 mg/dL. Recheck Mg level in the AM.         ondansetron (ZOFRAN) tablet 8 mg   Dose: 8 mg  Freq: Every 8 Hours PRN Route: PO  PRN Reasons: Nausea,Vomiting  Start: 06/09/21 0703         Pharmacy Consult - Pharmacy to dose   Freq: Continuous PRN Route: XX  PRN Reason: Consult  Start: 06/08/21 1920    Order specific questions:   Pharmacy to Dose: Cefepime  Indication of Use Sepsis, PNA, UTI         Pharmacy Consult - Pharmacy to dose   Freq: Continuous PRN Route: XX  PRN Reason: Consult  Start: 06/08/21 1920    Order specific questions:   Pharmacy to Dose: Vanc  Indication of Use Sepsis, PNA, UTI         potassium chloride (K-DUR,KLOR-CON) ER tablet 40 mEq   Dose: 40 mEq  Freq: As Needed Route: PO  PRN Comment: Potassium Replacement.  See Admin Instructions  Start: 06/09/21 0722    Admin Instructions:   Potassium 3.1 or Less Give KCl 40 mEq q4h x3 Doses   Potassium 3.2 - 3.6 Give KCl 40 mEq q4h x2 Doses     Check Potassium 4 Hours After Last Dose Given   Check Magnesium if Potassium Level Remains Low After Replacement   DO NOT GIVE if CrCl is Less Than 30 mL/minute or Urine Output Less Than 30 mL/hr  Swallow whole; do not crush, split, or chew.         Or  potassium chloride (KLOR-CON) packet 40 mEq   Dose: 40 mEq  Freq: As Needed Route: PO  PRN Comment: potassium replacement, see admin instructions  Start: 06/09/21 0722    Admin Instructions:   Potassium 3.1 or Less Give KCl 40 mEq q4h x3 Doses   Potassium 3.2 - 3.6 Give KCl 40 mEq q4h x2 Doses     Check Potassium 4 Hours After Last Dose Given   Check Magnesium if Potassium Level Remains Low After Replacement   DO NOT GIVE if CrCl is Less Than 30 mL/minute or Urine Output Less Than 30 mL/hr         Or  potassium chloride 10 mEq in 100 mL IVPB   Dose: 10 mEq  Freq: Every 1 Hour PRN Route: IV  PRN Comment: Potassium Replacement - See Admin  Instructions  Start: 06/09/21 0722    Admin Instructions:   Peripheral or Central IV  Potassium 3.1 or Less Give KCl 10 mEq/100 mL NS IV q1h x6 Doses  Potassium 3.2 - 3.6 Give KCl 10 mEq/100 mL NS q1h x4 Doses    Check Potassium 4 Hours After Last Dose Given  Check Magnesium if Potassium Remains Low After Replacement  DO NOT GIVE if CrCl is Less Than 30 mL/minute or Urine Output Less Than 30 mL/hr.     Rates Greater Than 10 mEq/hr Require ECG Monitoring.  OUTPATIENT/NON-MONITORED UNITS: Potassium Chloride standard bolus infusion rate is a maximum of 10 mEq/hr on unmonitored patients    MONITORED UNITS: Potassium Chloride standard bolus infusion rate is a maximum of 20 mEq/hr on ECG monitored patients ONLY         potassium phosphate 45 mmol in sodium chloride 0.9 % 500 mL infusion   Dose: 45 mmol  Freq: As Needed Route: IV  PRN Comment: Peripheral IV - Phosphorus Less Than 1.3 mg/dL  Start: 06/09/21 0722    Admin Instructions:   Recheck Phosphorus level 6 hours after replacement complete.  Refrigerate.     Doses listed as mmol of phosphate.   4.4 mEq of Potassium = 3 mmol of Phosphate         Or  potassium phosphate 30 mmol in sodium chloride 0.9 % 250 mL infusion   Dose: 30 mmol  Freq: As Needed Route: IV  PRN Comment: Peripheral IV - Phosphorus 1.3 - 1.7 mg/dL  Start: 06/09/21 0722    Admin Instructions:   Recheck Phosphorus level 6 hours after replacement complete.  Refrigerate.     Doses listed as mmol of phosphate.   4.4 mEq of Potassium = 3 mmol of Phosphate         Or  potassium phosphate 15 mmol in sodium chloride 0.9 % 100 mL infusion   Dose: 15 mmol  Freq: As Needed Route: IV  PRN Comment: Peripheral IV - Phosphorus 1.8 - 2.5 mg/dL  Start: 06/09/21 0722    Admin Instructions:   Recheck Phosphorus level 6 hours after replacement complete.  Refrigerate.     Doses listed as mmol of phosphate.   4.4 mEq of Potassium = 3 mmol of Phosphate         Or  sodium phosphates 45 mmol in sodium chloride 0.9 % 500 mL  IVPB   Dose: 45 mmol  Freq: As Needed Route: IV  PRN Comment: Peripheral IV - Phosphorus Less Than 1.3 mg/dL & Potassium Greater Than 4  Start: 06/09/21 0722    Admin Instructions:   Recheck Phosphorus level 6 hours after replacement complete.         Or  sodium phosphates 30 mmol in sodium chloride 0.9 % 250 mL IVPB   Dose: 30 mmol  Freq: As Needed Route: IV  PRN Comment: Peripheral IV - Phosphorus 1.3-1.7 mg/dL & Potassium Greater Than 4  Start: 06/09/21 0722    Admin Instructions:   Recheck Phosphorus level 6 hours after replacement complete.         Or  sodium phosphates 15 mmol in sodium chloride 0.9 % 250 mL IVPB   Dose: 15 mmol  Freq: As Needed Route: IV  PRN Comment: Peripheral IV - Phosphorus 1.8 - 2.5 mg/dL & Potassium Greater Than 4  Start: 06/09/21 0722    Admin Instructions:   Recheck Phosphorus level 6 hours after replacement complete.         sodium chloride 0.9 % flush 10 mL   Dose: 10 mL  Freq: As Needed Route: IV  PRN Reason: Line Care  PRN Comment: After Medication Administration  Start: 06/08/21 2334         sodium chloride 0.9 % flush 10 mL   Dose: 10 mL  Freq: As Needed Route: IV  PRN Reason: Line Care  PRN Comment: After Medication Administration or Blood Draw  Start: 06/08/21 2334         sodium chloride 0.9 % flush 10 mL   Dose: 10 mL  Freq: As Needed Route: IV  PRN Reason: Line Care  Start: 06/08/21 2247         sodium chloride 0.9 % flush 10 mL   Dose: 10 mL  Freq: As Needed Route: IV  PRN Reason: Line Care  Start: 06/08/21 1320         sodium chloride 0.9 % flush 20 mL   Dose: 20 mL  Freq: As Needed Route: I  PRN Reason: Line Care  PRN Comment: After Blood Draws or Blood Product Administration  Start: 06/08/21 2334         Medications 06/07/21 06/08/21 06/09/21

## 2021-06-09 NOTE — PROGRESS NOTES
THC Physician - Brief Progress Note  PERMANENT  06/08/2021 23:29    [facility] - Main - Main - CCU - 10 - [place], [place] ([facility])    DICK DIAZ    Date of Service 06/08/2021 23:29    HPI/Events of Note [facility] Provider Assessment Note    46/F found unresponsive and noted to be hypotensiv, febrile, hypoxicand tachycardic in ER. UDS was positive for benzodiazepine, opiates/oxycodone and THC and was given Narcan. Admitted for management of Sepsis d/t UTI/Pneumonia  Na 135, K: 4.0, BUN 28, Creat 1.93, Lactic 0.8  CT Head and Abdomen: No acute abnormalities  CT Chest: LEft lingular pneumonia and patchy bilateral ground glass airspace disase ? Chronic inflammatory or fibrotic process.    PMH: HTN, Arthritis, Headache, Stroke    Assessment and Plan:    Sepsis d/t UTI/Pneumonia  - IVF  - Cultures, Trend lactates  - IV antibiotics (Vancomycin/Cefepime)  - Supplemental oxygen to be titrated to keep SpO2 > 93%  - Follow renal functions/electrolytes    __X___   Video Assessment performed  __X___   Most recent labs reviewed  __X___   Vital Signs reviewed  __X___   Best Practices addressed:                 VTE prophylaxis: Lovenox                 SUP (when indicated):                 Glycemic control:                      Please notify bedside physician when present or [facility] if glc > 180 X 2                 Sepsis guidelines: Y                 Lung protective strategy NA                 Targeted Temperature Management: NA    __X___     Spoke with bedside RN  _____     Orders written      Contact [facility] for any needs if bedside physician is not present.      Interventions Major-Other: Initial assessment        Electronically Signed by: Ronnie Segura) on 06/09/2021 00:52

## 2021-06-09 NOTE — PLAN OF CARE
Goal Outcome Evaluation:  Plan of Care Reviewed With: patient, spouse        Progress: improving  Outcome Summary: Pt admitted to CCU for AMS, pt was very hard to arouse when she got on the unit but over the course of the shift she started to become more alert and could answer questions. Pt has been complaining of pain and seems to be very anxious, pt states she takes Xanax for last 25 years and needs it to function.  updated on status of pt and privacy password set up. Pt on 4L NC and tolerating well. VSS and afebrile.

## 2021-06-10 ENCOUNTER — APPOINTMENT (OUTPATIENT)
Dept: CARDIOLOGY | Facility: HOSPITAL | Age: 46
End: 2021-06-10

## 2021-06-10 VITALS
BODY MASS INDEX: 35.34 KG/M2 | HEART RATE: 93 BPM | OXYGEN SATURATION: 96 % | SYSTOLIC BLOOD PRESSURE: 144 MMHG | DIASTOLIC BLOOD PRESSURE: 97 MMHG | TEMPERATURE: 98.1 F | HEIGHT: 60 IN | RESPIRATION RATE: 12 BRPM | WEIGHT: 180 LBS

## 2021-06-10 LAB
ALBUMIN SERPL-MCNC: 2.87 G/DL (ref 3.5–5.2)
ALBUMIN/GLOB SERPL: 1.1 G/DL
ALP SERPL-CCNC: 44 U/L (ref 39–117)
ALT SERPL W P-5'-P-CCNC: 23 U/L (ref 1–33)
ANION GAP SERPL CALCULATED.3IONS-SCNC: 9.5 MMOL/L (ref 5–15)
AST SERPL-CCNC: 12 U/L (ref 1–32)
BACTERIA SPEC AEROBE CULT: ABNORMAL
BASOPHILS # BLD AUTO: 0.02 10*3/MM3 (ref 0–0.2)
BASOPHILS NFR BLD AUTO: 0.2 % (ref 0–1.5)
BH CV ECHO MEAS - ACS: 2.1 CM
BH CV ECHO MEAS - AO ROOT AREA (BSA CORRECTED): 1.6
BH CV ECHO MEAS - AO ROOT AREA: 6.6 CM^2
BH CV ECHO MEAS - AO ROOT DIAM: 2.9 CM
BH CV ECHO MEAS - BSA(HAYCOCK): 1.9 M^2
BH CV ECHO MEAS - BSA: 1.8 M^2
BH CV ECHO MEAS - BZI_BMI: 36.9 KILOGRAMS/M^2
BH CV ECHO MEAS - BZI_METRIC_HEIGHT: 152.4 CM
BH CV ECHO MEAS - BZI_METRIC_WEIGHT: 85.7 KG
BH CV ECHO MEAS - EDV(CUBED): 68.9 ML
BH CV ECHO MEAS - EDV(MOD-SP4): 53.7 ML
BH CV ECHO MEAS - EDV(TEICH): 74.2 ML
BH CV ECHO MEAS - EF(CUBED): 47.9 %
BH CV ECHO MEAS - EF(MOD-SP4): 75.6 %
BH CV ECHO MEAS - EF(TEICH): 40.5 %
BH CV ECHO MEAS - ESV(CUBED): 35.9 ML
BH CV ECHO MEAS - ESV(MOD-SP4): 13.1 ML
BH CV ECHO MEAS - ESV(TEICH): 44.1 ML
BH CV ECHO MEAS - FS: 19.5 %
BH CV ECHO MEAS - IVS/LVPW: 1.3
BH CV ECHO MEAS - IVSD: 1.2 CM
BH CV ECHO MEAS - LA DIMENSION: 3.9 CM
BH CV ECHO MEAS - LA/AO: 1.3
BH CV ECHO MEAS - LV DIASTOLIC VOL/BSA (35-75): 29.5 ML/M^2
BH CV ECHO MEAS - LV MASS(C)D: 141.5 GRAMS
BH CV ECHO MEAS - LV MASS(C)DI: 77.7 GRAMS/M^2
BH CV ECHO MEAS - LV SYSTOLIC VOL/BSA (12-30): 7.2 ML/M^2
BH CV ECHO MEAS - LVIDD: 4.1 CM
BH CV ECHO MEAS - LVIDS: 3.3 CM
BH CV ECHO MEAS - LVLD AP4: 6.2 CM
BH CV ECHO MEAS - LVLS AP4: 5.6 CM
BH CV ECHO MEAS - LVOT AREA (M): 2.5 CM^2
BH CV ECHO MEAS - LVOT AREA: 2.5 CM^2
BH CV ECHO MEAS - LVOT DIAM: 1.8 CM
BH CV ECHO MEAS - LVPWD: 0.9 CM
BH CV ECHO MEAS - MV A MAX VEL: 97.9 CM/SEC
BH CV ECHO MEAS - MV E MAX VEL: 97.9 CM/SEC
BH CV ECHO MEAS - MV E/A: 1
BH CV ECHO MEAS - PA ACC TIME: 0.14 SEC
BH CV ECHO MEAS - PA PR(ACCEL): 15.6 MMHG
BH CV ECHO MEAS - SI(CUBED): 18.1 ML/M^2
BH CV ECHO MEAS - SI(MOD-SP4): 22.3 ML/M^2
BH CV ECHO MEAS - SI(TEICH): 16.5 ML/M^2
BH CV ECHO MEAS - SV(CUBED): 33 ML
BH CV ECHO MEAS - SV(MOD-SP4): 40.6 ML
BH CV ECHO MEAS - SV(TEICH): 30.1 ML
BILIRUB SERPL-MCNC: 0.3 MG/DL (ref 0–1.2)
BUN SERPL-MCNC: 8 MG/DL (ref 6–20)
BUN/CREAT SERPL: 15.1 (ref 7–25)
CALCIUM SPEC-SCNC: 8.1 MG/DL (ref 8.6–10.5)
CHLORIDE SERPL-SCNC: 101 MMOL/L (ref 98–107)
CO2 SERPL-SCNC: 26.5 MMOL/L (ref 22–29)
CREAT SERPL-MCNC: 0.53 MG/DL (ref 0.57–1)
CRP SERPL-MCNC: 32.46 MG/DL (ref 0–0.5)
DEPRECATED RDW RBC AUTO: 51.1 FL (ref 37–54)
EOSINOPHIL # BLD AUTO: 0.09 10*3/MM3 (ref 0–0.4)
EOSINOPHIL NFR BLD AUTO: 0.9 % (ref 0.3–6.2)
ERYTHROCYTE [DISTWIDTH] IN BLOOD BY AUTOMATED COUNT: 16.2 % (ref 12.3–15.4)
GFR SERPL CREATININE-BSD FRML MDRD: 124 ML/MIN/1.73
GLOBULIN UR ELPH-MCNC: 2.6 GM/DL
GLUCOSE SERPL-MCNC: 114 MG/DL (ref 65–99)
HCT VFR BLD AUTO: 34.6 % (ref 34–46.6)
HGB BLD-MCNC: 11.2 G/DL (ref 12–15.9)
IMM GRANULOCYTES # BLD AUTO: 0.09 10*3/MM3 (ref 0–0.05)
IMM GRANULOCYTES NFR BLD AUTO: 0.9 % (ref 0–0.5)
LYMPHOCYTES # BLD AUTO: 1.7 10*3/MM3 (ref 0.7–3.1)
LYMPHOCYTES NFR BLD AUTO: 17.2 % (ref 19.6–45.3)
MAGNESIUM SERPL-MCNC: 2.2 MG/DL (ref 1.6–2.6)
MAXIMAL PREDICTED HEART RATE: 174 BPM
MCH RBC QN AUTO: 28 PG (ref 26.6–33)
MCHC RBC AUTO-ENTMCNC: 32.4 G/DL (ref 31.5–35.7)
MCV RBC AUTO: 86.5 FL (ref 79–97)
MONOCYTES # BLD AUTO: 0.5 10*3/MM3 (ref 0.1–0.9)
MONOCYTES NFR BLD AUTO: 5 % (ref 5–12)
NEUTROPHILS NFR BLD AUTO: 7.51 10*3/MM3 (ref 1.7–7)
NEUTROPHILS NFR BLD AUTO: 75.8 % (ref 42.7–76)
NRBC BLD AUTO-RTO: 0 /100 WBC (ref 0–0.2)
PHOSPHATE SERPL-MCNC: 2.2 MG/DL (ref 2.5–4.5)
PLATELET # BLD AUTO: 146 10*3/MM3 (ref 140–450)
PMV BLD AUTO: 9.4 FL (ref 6–12)
POTASSIUM SERPL-SCNC: 3.3 MMOL/L (ref 3.5–5.2)
PROT SERPL-MCNC: 5.5 G/DL (ref 6–8.5)
RBC # BLD AUTO: 4 10*6/MM3 (ref 3.77–5.28)
SODIUM SERPL-SCNC: 137 MMOL/L (ref 136–145)
STRESS TARGET HR: 148 BPM
WBC # BLD AUTO: 9.91 10*3/MM3 (ref 3.4–10.8)

## 2021-06-10 PROCEDURE — 83735 ASSAY OF MAGNESIUM: CPT | Performed by: INTERNAL MEDICINE

## 2021-06-10 PROCEDURE — 84100 ASSAY OF PHOSPHORUS: CPT | Performed by: INTERNAL MEDICINE

## 2021-06-10 PROCEDURE — 25010000002 ENOXAPARIN PER 10 MG: Performed by: INTERNAL MEDICINE

## 2021-06-10 PROCEDURE — 86140 C-REACTIVE PROTEIN: CPT | Performed by: INTERNAL MEDICINE

## 2021-06-10 PROCEDURE — 25010000002 VANCOMYCIN: Performed by: INTERNAL MEDICINE

## 2021-06-10 PROCEDURE — 25010000002 CEFEPIME PER 500 MG: Performed by: HOSPITALIST

## 2021-06-10 PROCEDURE — 80053 COMPREHEN METABOLIC PANEL: CPT | Performed by: INTERNAL MEDICINE

## 2021-06-10 PROCEDURE — 99239 HOSP IP/OBS DSCHRG MGMT >30: CPT | Performed by: INTERNAL MEDICINE

## 2021-06-10 PROCEDURE — 85025 COMPLETE CBC W/AUTO DIFF WBC: CPT | Performed by: INTERNAL MEDICINE

## 2021-06-10 PROCEDURE — 93306 TTE W/DOPPLER COMPLETE: CPT

## 2021-06-10 RX ORDER — POTASSIUM CHLORIDE 1.5 G/1.77G
40 POWDER, FOR SOLUTION ORAL EVERY 4 HOURS
Status: DISCONTINUED | OUTPATIENT
Start: 2021-06-10 | End: 2021-06-10 | Stop reason: HOSPADM

## 2021-06-10 RX ADMIN — SODIUM CHLORIDE, PRESERVATIVE FREE 10 ML: 5 INJECTION INTRAVENOUS at 09:17

## 2021-06-10 RX ADMIN — ENOXAPARIN SODIUM 40 MG: 40 INJECTION SUBCUTANEOUS at 01:00

## 2021-06-10 RX ADMIN — VANCOMYCIN HYDROCHLORIDE 1250 MG: 10 INJECTION, POWDER, LYOPHILIZED, FOR SOLUTION INTRAVENOUS at 01:00

## 2021-06-10 RX ADMIN — SODIUM CHLORIDE, PRESERVATIVE FREE 10 ML: 5 INJECTION INTRAVENOUS at 09:18

## 2021-06-10 RX ADMIN — NICOTINE 1 PATCH: 14 PATCH, EXTENDED RELEASE TRANSDERMAL at 09:15

## 2021-06-10 RX ADMIN — METRONIDAZOLE 500 MG: 500 INJECTION, SOLUTION INTRAVENOUS at 06:47

## 2021-06-10 RX ADMIN — SODIUM CHLORIDE 75 ML/HR: 9 INJECTION, SOLUTION INTRAVENOUS at 07:28

## 2021-06-10 RX ADMIN — ESTROGENS, CONJUGATED 0.6 MG: 0.3 TABLET, FILM COATED ORAL at 09:14

## 2021-06-10 RX ADMIN — SODIUM CHLORIDE, PRESERVATIVE FREE 10 ML: 5 INJECTION INTRAVENOUS at 09:16

## 2021-06-10 RX ADMIN — CEFEPIME HYDROCHLORIDE 1 G: 1 INJECTION, POWDER, FOR SOLUTION INTRAMUSCULAR; INTRAVENOUS at 06:47

## 2021-06-10 RX ADMIN — LEVOTHYROXINE SODIUM 25 MCG: 25 TABLET ORAL at 09:15

## 2021-06-10 RX ADMIN — Medication 1 CAPSULE: at 09:15

## 2021-06-10 NOTE — PLAN OF CARE
Problem: Fall Injury Risk  Goal: Absence of Fall and Fall-Related Injury  Outcome: Ongoing, Progressing     Problem: Fall Injury Risk  Goal: Absence of Fall and Fall-Related Injury  Outcome: Ongoing, Progressing     Problem: Adult Inpatient Plan of Care  Goal: Plan of Care Review  Outcome: Ongoing, Progressing  Goal: Patient-Specific Goal (Individualized)  Outcome: Ongoing, Progressing  Goal: Absence of Hospital-Acquired Illness or Injury  Outcome: Ongoing, Progressing  Goal: Optimal Comfort and Wellbeing  Outcome: Ongoing, Progressing  Goal: Readiness for Transition of Care  Outcome: Ongoing, Progressing     Problem: Hypertension Comorbidity  Goal: Blood Pressure in Desired Range  Outcome: Ongoing, Progressing     Problem: Pain Chronic (Persistent) (Comorbidity Management)  Goal: Acceptable Pain Control and Functional Ability  Outcome: Ongoing, Progressing     Problem: Skin Injury Risk Increased  Goal: Skin Health and Integrity  Outcome: Ongoing, Progressing   Goal Outcome Evaluation:

## 2021-06-10 NOTE — NURSING NOTE
Instructed patient to try and urinate before she leaves AMA, pt states she can't use public restrooms and will when she goes home.  I informed patient if she had problems urinating when she got home, she should come back to the ER.  Pt and  expressed understanding.

## 2021-06-10 NOTE — PLAN OF CARE
Goal Outcome Evaluation:              Outcome Summary: Pt stable overnight, remains on 2L NC, UOP adequate, VSS.

## 2021-06-10 NOTE — DISCHARGE SUMMARY
Date of Admission: 6/8/2021    Date of Discharge: 6/10/2021     PCP: Torito De La Torre APRN    Admission Diagnosis:   Please see admission H&P    Discharge Diagnosis:   Severe sepsis  Left-sided pneumonia  Acute hypoxic respiratory failure  UTI  Acute encephalopathy, resolved  MARCIN  (+) D-Dimer  Diarrhea  Hypokalemia  Hypomagnesemia  Hypophosphatemia  Polysubstance abuse  Hypothyroidism    Procedures Performed:  6/8/21: Right IJ central line placement in the ED     Consults:   Consults     No orders found from 5/10/2021 to 6/9/2021.            History of Present Illness:  Stacey Diaz is a 46 y.o. female who presented to [facility] ED of AMS and unresponsiveness. Please see admission H&P for complete details.     In the ED, workup revealed acute hypoxic respiratory failure, severe sepsis, left-sided pneumonia, UTI and MARCIN. UDS was positive for benzodiazepines, opiates, oxycodone and THC. She was given Narcan with some response. D-Dimer was positive. Cultures were obtained and IV antibiotics initiated. She was given IV fluids as well. Her BP remained marginal and a central line was placed.        Hospital Course  Stacey Diaz was admitted to the CCU for further evaluation and treatment. She was continued on broad spectrum IV antibiotics including Vanc, Cefepime and Flagyl and maintenance IV fluids. Any potential nephrotoxic agents were avoided and any potential sedating medications were avoided as well.     Additional Narcan was ordered soon upon admission with subsequent improvement in her mental status. Her encephalopathy quickly resolved and she began requesting pain medication. She admitted to illicit drug use after her PCP stopped prescribing her controlled substances. In addition to benzodiazepines and opioids she admitted to buying gabapentin as well. She adamantly denied an intentional or unintentional overdose. CIWA was ordered to monitor for any signs of withdrawal. A V/Q scan was initially ordered for  further evaluation in the setting of her acute hypoxic resp failure, (+) D-Dimer and MARCIN but she refused. Once her renal function improved a CT chest PE protocol was obtained which was negative for PE. Venous duplex US of the bilateral lower extremities was negative for DVT. Echo revealed an EF of 66-70% and grade I diastolic dysfunction.      Respiratory PCR was negative and MRSA PCR was negative as well. A swallow eval was completed once her encephalopathy resolved with no evidence of aspiration. Repeat labs revealed electrolyte abnormalities and the hospital's electrolyte replacement protocols were initiated. Diarrhea was noted on admission and stool studies including C diff and GI PCR were obtained and negative. Her BP improved with IV fluids and initiation of vasopressor support was not required.     Mrs. Diaz was seen and examined with Ida RUIZ RN on 6/10/21. She remained alert and oriented. She again requested pain medication and I explained clinical reasoning for not prescribing pain medication. She expressed a desire to leave AMA and she was encouraged to remain hospitalized for treatment in the setting of sepsis and hypoxic respiratory failure. The risks of leaving AMA, including worsening illness and possible death were explained. However, she remained adamant she wished to leave and once family arrived to provide transportation she signed out AMA. She was encouraged to return to the hospital to seek medical attention if needed.       Condition on Discharge:  Guarded as pt left AMA.     Vital Signs  Vitals:    06/10/21 1221   BP: 144/97   Pulse: 93   Resp: 12   Temp: 98.1 °F (36.7 °C)   SpO2:        Physical Exam:  General:    Awake, alert, in no acute distress, ill appearing   Heart:      Normal S1 and S2. Regular rate and rhythm. No significant murmur, rubs or gallops appreciated.   Lungs:     Respirations regular, even and unlabored. Diminished breath sounds throughout. No wheezes, rales or rhonchi.    Abdomen:   Soft and nontender. No guarding, rebound tenderness or  organomegaly noted. Bowel sounds present x 4.   Extremities:  No clubbing, cyanosis or edema noted. Moves UE and LE equally B/L.     Discharge Disposition: Pt left AMA.       Discharge Medications:  Not provided as pt left AMA.       Discharge Diet:   Dietary Orders (From admission, onward)     Start     Ordered    06/09/21 1530  Diet Soft Texture; Chopped; Thin  Diet Effective Now     Question Answer Comment   Diet Texture / Consistency Soft Texture    Select Texture: Chopped    Fluid Consistency Thin        06/09/21 1529                Activity at Discharge:  activity as tolerated    Follow-up Appointments:  Not provided as pt left AMA.         Test Results Pending at Discharge:  Culture results      Quentin Erwin DO  06/10/21  13:04 EDT      Time: Greater than 30 minutes spent on this discharge.

## 2021-06-10 NOTE — NURSING NOTE
Pt leaving AMA with , Rolando.  MÓNICA's d/c; pt signed AMA form and was instructed on risks of leaving and the benefits of continuing treatment and staying in hospital.

## 2021-06-13 LAB
BACTERIA SPEC AEROBE CULT: NORMAL
BACTERIA SPEC AEROBE CULT: NORMAL

## 2024-12-10 NOTE — CASE MANAGEMENT/SOCIAL WORK
Discharge Planning Assessment   Main     Patient Name: Stacey Diaz  MRN: [id number]  Today's Date: 6/9/2021    Admit Date: 6/8/2021    Discharge Needs Assessment     Row Name 06/09/21 0949       Living Environment    Lives With  spouse    Name(s) of Who Lives With Patient  Lives with Rolando olsen    Current Living Arrangements  home/apartment/condo    Primary Care Provided by  self;spouse/significant other    Provides Primary Care For  no one    Quality of Family Relationships  helpful;involved;supportive    Able to Return to Prior Arrangements  yes       Transition Planning    Patient/Family Anticipates Transition to  home    Patient/Family Anticipated Services at Transition  durable medical equipment    Transportation Anticipated  family or friend will provide       Discharge Needs Assessment    Equipment Currently Used at Home  none    Concerns to be Addressed  substance/tobacco abuse/use    Equipment Needed After Discharge  walker, rolling    Current Discharge Risk  substance use/abuse        Discharge Plan     Row Name 06/09/21 0933       Plan    Plan SS spoke with pt's spouse, Rolando. Pt lives at home with Rolando. Pt does not utilize HH. Pt's spouse thinks pt would benefit from rolling walker, to be arranged with doctor's order at d/c. Family prefers [facility] and for equipment to be delivered to hospital at d/c. Pt does not have a POA or living will. Pt's spouse will provide transportation home at d/c. SS to follow up with pt regarding substance abuse resources. SS to follow and assist.    Patient/Family in Agreement with Plan  yes        Demographic Summary     Row Name 06/09/21 0973       General Information    Referral Source  nursing    Reason for Consult  -- epic prompt        PHAM Chowdhury     Received in basket message to schedule patient for a HFU. Patient is scheduled for 12/18 with  in the [place] office.    In basket message:    DO EILEEN Flanagan [place] Clerical  Hello,    Family set this patient up for follow-up after her discharge tomorrow, please?    Thank you,  James Zaidi